# Patient Record
Sex: FEMALE | Race: WHITE | NOT HISPANIC OR LATINO | Employment: OTHER | URBAN - METROPOLITAN AREA
[De-identification: names, ages, dates, MRNs, and addresses within clinical notes are randomized per-mention and may not be internally consistent; named-entity substitution may affect disease eponyms.]

---

## 2017-02-10 ENCOUNTER — APPOINTMENT (OUTPATIENT)
Dept: PREADMISSION TESTING | Facility: HOSPITAL | Age: 57
End: 2017-02-10

## 2017-02-10 ENCOUNTER — HOSPITAL ENCOUNTER (OUTPATIENT)
Dept: GENERAL RADIOLOGY | Facility: HOSPITAL | Age: 57
Discharge: HOME | End: 2017-02-10
Admitting: ORTHOPAEDIC SURGERY

## 2017-02-10 VITALS — WEIGHT: 160.94 LBS | HEIGHT: 66 IN | BODY MASS INDEX: 25.86 KG/M2

## 2017-02-10 LAB
ANION GAP SERPL CALCULATED.3IONS-SCNC: 3 MMOL/L (ref 3–11)
BACTERIA UR QL AUTO: ABNORMAL /HPF
BILIRUB UR QL STRIP: NEGATIVE
BUN BLD-MCNC: 16 MG/DL (ref 9–23)
BUN/CREAT SERPL: 22.9 (ref 7–25)
CALCIUM SPEC-SCNC: 9.4 MG/DL (ref 8.7–10.4)
CHLORIDE SERPL-SCNC: 109 MMOL/L (ref 99–109)
CLARITY UR: CLEAR
CO2 SERPL-SCNC: 30 MMOL/L (ref 20–31)
COLOR UR: YELLOW
CREAT BLD-MCNC: 0.7 MG/DL (ref 0.6–1.3)
DEPRECATED RDW RBC AUTO: 51.9 FL (ref 37–54)
ERYTHROCYTE [DISTWIDTH] IN BLOOD BY AUTOMATED COUNT: 15 % (ref 11.3–14.5)
GFR SERPL CREATININE-BSD FRML MDRD: 86 ML/MIN/1.73
GLUCOSE BLD-MCNC: 92 MG/DL (ref 70–100)
GLUCOSE UR STRIP-MCNC: NEGATIVE MG/DL
HBA1C MFR BLD: 5.8 % (ref 4.8–5.6)
HCT VFR BLD AUTO: 34.5 % (ref 34.5–44)
HGB BLD-MCNC: 11.2 G/DL (ref 11.5–15.5)
HGB UR QL STRIP.AUTO: NEGATIVE
HYALINE CASTS UR QL AUTO: ABNORMAL /LPF
KETONES UR QL STRIP: NEGATIVE
LEUKOCYTE ESTERASE UR QL STRIP.AUTO: NEGATIVE
MCH RBC QN AUTO: 32.6 PG (ref 27–31)
MCHC RBC AUTO-ENTMCNC: 32.5 G/DL (ref 32–36)
MCV RBC AUTO: 100.3 FL (ref 80–99)
NITRITE UR QL STRIP: NEGATIVE
PH UR STRIP.AUTO: 6 [PH] (ref 5–8)
PLATELET # BLD AUTO: 355 10*3/MM3 (ref 150–450)
PMV BLD AUTO: 8.4 FL (ref 6–12)
POTASSIUM BLD-SCNC: 4.5 MMOL/L (ref 3.5–5.5)
PROT UR QL STRIP: NEGATIVE
RBC # BLD AUTO: 3.44 10*6/MM3 (ref 3.89–5.14)
RBC # UR: ABNORMAL /HPF
REF LAB TEST METHOD: ABNORMAL
SODIUM BLD-SCNC: 142 MMOL/L (ref 132–146)
SP GR UR STRIP: 1.02 (ref 1–1.03)
SQUAMOUS #/AREA URNS HPF: ABNORMAL /HPF
UROBILINOGEN UR QL STRIP: NORMAL
WBC NRBC COR # BLD: 10.64 10*3/MM3 (ref 3.5–10.8)
WBC UR QL AUTO: ABNORMAL /HPF

## 2017-02-10 PROCEDURE — 83036 HEMOGLOBIN GLYCOSYLATED A1C: CPT | Performed by: ORTHOPAEDIC SURGERY

## 2017-02-10 PROCEDURE — 71020 HC CHEST PA AND LATERAL: CPT

## 2017-02-10 PROCEDURE — 81001 URINALYSIS AUTO W/SCOPE: CPT | Performed by: ORTHOPAEDIC SURGERY

## 2017-02-10 PROCEDURE — 93010 ELECTROCARDIOGRAM REPORT: CPT | Performed by: INTERNAL MEDICINE

## 2017-02-10 PROCEDURE — 36415 COLL VENOUS BLD VENIPUNCTURE: CPT

## 2017-02-10 PROCEDURE — 85027 COMPLETE CBC AUTOMATED: CPT | Performed by: ORTHOPAEDIC SURGERY

## 2017-02-10 PROCEDURE — 93005 ELECTROCARDIOGRAM TRACING: CPT

## 2017-02-10 PROCEDURE — 80048 BASIC METABOLIC PNL TOTAL CA: CPT | Performed by: ORTHOPAEDIC SURGERY

## 2017-02-10 RX ORDER — GABAPENTIN 800 MG/1
800 TABLET ORAL 3 TIMES DAILY
COMMUNITY

## 2017-02-10 RX ORDER — CLONAZEPAM 2 MG/1
2 TABLET ORAL 3 TIMES DAILY PRN
COMMUNITY

## 2017-02-10 RX ORDER — BUPROPION HYDROCHLORIDE 100 MG/1
100 TABLET ORAL 3 TIMES DAILY
COMMUNITY

## 2017-02-10 RX ORDER — OXYCODONE AND ACETAMINOPHEN 10; 325 MG/1; MG/1
1 TABLET ORAL EVERY 6 HOURS PRN
COMMUNITY
End: 2017-02-17 | Stop reason: HOSPADM

## 2017-02-10 RX ORDER — ESCITALOPRAM OXALATE 10 MG/1
10 TABLET ORAL DAILY
COMMUNITY

## 2017-02-10 RX ORDER — ROSUVASTATIN CALCIUM 40 MG/1
40 TABLET, COATED ORAL DAILY
COMMUNITY

## 2017-02-10 RX ORDER — ZIPRASIDONE HYDROCHLORIDE 80 MG/1
80 CAPSULE ORAL 2 TIMES DAILY WITH MEALS
COMMUNITY

## 2017-02-10 NOTE — PAT
PATIENT REPORTS YEAST INFECTION CURRENTLY. WAS TREATED WITH MEDS FROM PCP IN [place], [place]- [facility] YEAST INFECTION HAS NOT RESOLVED, AND HAS MOVED BACK TO [place] IN THE LAST FEW DAYS AND HAS NOT BEEN ABLE TO GET BACK TO PCP. C/O BURNING WITH URINATION, TELEPHONE ORDER FOR [facility]A RECEIVED FROM DR ARANDA'S SURGERY SCHEDULER.    Measured for TEDS/SCDS in PAT-     calf measurement       10  Length measurement   17

## 2017-02-13 ENCOUNTER — ANESTHESIA (OUTPATIENT)
Dept: PERIOP | Facility: HOSPITAL | Age: 57
End: 2017-02-13

## 2017-02-13 ENCOUNTER — ANESTHESIA EVENT (OUTPATIENT)
Dept: PERIOP | Facility: HOSPITAL | Age: 57
End: 2017-02-13

## 2017-02-13 ENCOUNTER — HOSPITAL ENCOUNTER (INPATIENT)
Facility: HOSPITAL | Age: 57
LOS: 4 days | Discharge: HOME HEALTH | End: 2017-02-17
Attending: ORTHOPAEDIC SURGERY | Admitting: ORTHOPAEDIC SURGERY

## 2017-02-13 ENCOUNTER — APPOINTMENT (OUTPATIENT)
Dept: GENERAL RADIOLOGY | Facility: HOSPITAL | Age: 57
End: 2017-02-13

## 2017-02-13 DIAGNOSIS — Z74.09 IMPAIRED MOBILITY AND ADLS: ICD-10-CM

## 2017-02-13 DIAGNOSIS — Z96.612 S/P REVERSE TOTAL SHOULDER ARTHROPLASTY, LEFT: ICD-10-CM

## 2017-02-13 DIAGNOSIS — Z74.09 IMPAIRED FUNCTIONAL MOBILITY, BALANCE, GAIT, AND ENDURANCE: Primary | ICD-10-CM

## 2017-02-13 DIAGNOSIS — Z78.9 IMPAIRED MOBILITY AND ADLS: ICD-10-CM

## 2017-02-13 PROBLEM — M65.9: Status: ACTIVE | Noted: 2017-02-13

## 2017-02-13 PROBLEM — Z72.0 TOBACCO ABUSE: Status: ACTIVE | Noted: 2017-02-13

## 2017-02-13 PROBLEM — M19.90 ARTHRITIS: Status: ACTIVE | Noted: 2017-02-13

## 2017-02-13 PROBLEM — F31.9 BIPOLAR DISORDER (HCC): Status: ACTIVE | Noted: 2017-02-13

## 2017-02-13 PROBLEM — S42.292A CLOSED 4-PART FRACTURE OF PROXIMAL END OF LEFT HUMERUS: Status: ACTIVE | Noted: 2017-02-13

## 2017-02-13 PROBLEM — G47.30 SLEEP APNEA: Status: ACTIVE | Noted: 2017-02-13

## 2017-02-13 PROBLEM — K21.9 GERD (GASTROESOPHAGEAL REFLUX DISEASE): Status: ACTIVE | Noted: 2017-02-13

## 2017-02-13 PROCEDURE — 25010000002 HYDROMORPHONE PER 4 MG: Performed by: ORTHOPAEDIC SURGERY

## 2017-02-13 PROCEDURE — 73030 X-RAY EXAM OF SHOULDER: CPT

## 2017-02-13 PROCEDURE — 0LS40ZZ REPOSITION LEFT UPPER ARM TENDON, OPEN APPROACH: ICD-10-PCS | Performed by: ORTHOPAEDIC SURGERY

## 2017-02-13 PROCEDURE — 25010000002 VANCOMYCIN PER 500 MG: Performed by: ORTHOPAEDIC SURGERY

## 2017-02-13 PROCEDURE — C1776 JOINT DEVICE (IMPLANTABLE): HCPCS | Performed by: ORTHOPAEDIC SURGERY

## 2017-02-13 PROCEDURE — 99253 IP/OBS CNSLTJ NEW/EST LOW 45: CPT | Performed by: FAMILY MEDICINE

## 2017-02-13 PROCEDURE — 25010000002 ONDANSETRON PER 1 MG: Performed by: NURSE ANESTHETIST, CERTIFIED REGISTERED

## 2017-02-13 PROCEDURE — 0RRK00Z REPLACEMENT OF LEFT SHOULDER JOINT WITH REVERSE BALL AND SOCKET SYNTHETIC SUBSTITUTE, OPEN APPROACH: ICD-10-PCS | Performed by: ORTHOPAEDIC SURGERY

## 2017-02-13 PROCEDURE — 25010000002 MIDAZOLAM PER 1 MG: Performed by: ANESTHESIOLOGY

## 2017-02-13 PROCEDURE — 25010000002 NEOSTIGMINE 10 MG/10ML SOLUTION: Performed by: NURSE ANESTHETIST, CERTIFIED REGISTERED

## 2017-02-13 PROCEDURE — 25010000002 DEXAMETHASONE PER 1 MG: Performed by: NURSE ANESTHETIST, CERTIFIED REGISTERED

## 2017-02-13 PROCEDURE — 25010000002 PROMETHAZINE PER 50 MG: Performed by: NURSE ANESTHETIST, CERTIFIED REGISTERED

## 2017-02-13 PROCEDURE — 25010000002 FENTANYL CITRATE (PF) 100 MCG/2ML SOLUTION: Performed by: NURSE ANESTHETIST, CERTIFIED REGISTERED

## 2017-02-13 PROCEDURE — L3670 SO ACRO/CLAV CAN WEB PRE OTS: HCPCS | Performed by: ORTHOPAEDIC SURGERY

## 2017-02-13 PROCEDURE — 25010000002 PHENYLEPHRINE PER 1 ML: Performed by: NURSE ANESTHETIST, CERTIFIED REGISTERED

## 2017-02-13 PROCEDURE — 25010000002 ROPIVACAINE PER 1 MG: Performed by: NURSE ANESTHETIST, CERTIFIED REGISTERED

## 2017-02-13 PROCEDURE — G0378 HOSPITAL OBSERVATION PER HR: HCPCS

## 2017-02-13 PROCEDURE — C1713 ANCHOR/SCREW BN/BN,TIS/BN: HCPCS | Performed by: ORTHOPAEDIC SURGERY

## 2017-02-13 PROCEDURE — 94799 UNLISTED PULMONARY SVC/PX: CPT

## 2017-02-13 PROCEDURE — 25010000002 PROPOFOL 10 MG/ML EMULSION: Performed by: NURSE ANESTHETIST, CERTIFIED REGISTERED

## 2017-02-13 PROCEDURE — 25010000002 FENTANYL CITRATE (PF) 100 MCG/2ML SOLUTION: Performed by: ANESTHESIOLOGY

## 2017-02-13 DEVICE — SCRW COMPR EQUINOXE LK 4.5X26MM: Type: IMPLANTABLE DEVICE | Site: SHOULDER | Status: FUNCTIONAL

## 2017-02-13 DEVICE — IMPLANTABLE DEVICE: Type: IMPLANTABLE DEVICE | Site: SHOULDER | Status: FUNCTIONAL

## 2017-02-13 DEVICE — SCRW EQUINOXE TORQ DEFINE REV SHLDR KT: Type: IMPLANTABLE DEVICE | Site: SHOULDER | Status: FUNCTIONAL

## 2017-02-13 DEVICE — SCRW COMPR EQUINOXE LK 4.5X18MM: Type: IMPLANTABLE DEVICE | Site: SHOULDER | Status: FUNCTIONAL

## 2017-02-13 DEVICE — PLT/JOINT GLEN EQUINOXE STD/POST: Type: IMPLANTABLE DEVICE | Site: SHOULDER | Status: FUNCTIONAL

## 2017-02-13 DEVICE — GLENOSPHERE SHLDR/REV EQUINOXE 42MM: Type: IMPLANTABLE DEVICE | Site: SHOULDER | Status: FUNCTIONAL

## 2017-02-13 DEVICE — SCRW LK EQUINOXE GLENOSPHERE REV/SHLDR: Type: IMPLANTABLE DEVICE | Site: SHOULDER | Status: FUNCTIONAL

## 2017-02-13 DEVICE — SCRW COMPR EQUINOXE LK 4.5X34MM: Type: IMPLANTABLE DEVICE | Site: SHOULDER | Status: FUNCTIONAL

## 2017-02-13 DEVICE — LINER HUM EQUINOXE REV SHLDR 42 PLS0: Type: IMPLANTABLE DEVICE | Site: SHOULDER | Status: FUNCTIONAL

## 2017-02-13 DEVICE — SCRW COMPR EQUINOXE LK 4.5X30MM: Type: IMPLANTABLE DEVICE | Site: SHOULDER | Status: FUNCTIONAL

## 2017-02-13 DEVICE — TRY HUM EQUINOXE ADPT REV SHLDR PLS0: Type: IMPLANTABLE DEVICE | Site: SHOULDER | Status: FUNCTIONAL

## 2017-02-13 DEVICE — SMARTSET HIGH PERFORMANCE MV MEDIUM VISCOSITY BONE CEMENT 40G
Type: IMPLANTABLE DEVICE | Site: SHOULDER | Status: FUNCTIONAL
Brand: SMARTSET

## 2017-02-13 RX ORDER — ZIPRASIDONE HYDROCHLORIDE 20 MG/1
80 CAPSULE ORAL 2 TIMES DAILY WITH MEALS
Status: DISCONTINUED | OUTPATIENT
Start: 2017-02-13 | End: 2017-02-17 | Stop reason: HOSPADM

## 2017-02-13 RX ORDER — CLONAZEPAM 1 MG/1
2 TABLET ORAL 3 TIMES DAILY PRN
Status: DISCONTINUED | OUTPATIENT
Start: 2017-02-13 | End: 2017-02-13 | Stop reason: SDUPTHER

## 2017-02-13 RX ORDER — ACETAMINOPHEN 325 MG/1
650 TABLET ORAL ONCE
Status: COMPLETED | OUTPATIENT
Start: 2017-02-13 | End: 2017-02-13

## 2017-02-13 RX ORDER — ESMOLOL HYDROCHLORIDE 10 MG/ML
INJECTION INTRAVENOUS AS NEEDED
Status: DISCONTINUED | OUTPATIENT
Start: 2017-02-13 | End: 2017-02-13 | Stop reason: SURG

## 2017-02-13 RX ORDER — FAMOTIDINE 10 MG/ML
20 INJECTION, SOLUTION INTRAVENOUS
Status: DISCONTINUED | OUTPATIENT
Start: 2017-02-13 | End: 2017-02-13 | Stop reason: HOSPADM

## 2017-02-13 RX ORDER — PROMETHAZINE HYDROCHLORIDE 25 MG/ML
6.25 INJECTION, SOLUTION INTRAMUSCULAR; INTRAVENOUS ONCE AS NEEDED
Status: COMPLETED | OUTPATIENT
Start: 2017-02-13 | End: 2017-02-13

## 2017-02-13 RX ORDER — BUPIVACAINE HYDROCHLORIDE 2.5 MG/ML
INJECTION, SOLUTION EPIDURAL; INFILTRATION; INTRACAUDAL AS NEEDED
Status: DISCONTINUED | OUTPATIENT
Start: 2017-02-13 | End: 2017-02-13 | Stop reason: SURG

## 2017-02-13 RX ORDER — CLINDAMYCIN PHOSPHATE 900 MG/50ML
900 INJECTION INTRAVENOUS ONCE
Status: COMPLETED | OUTPATIENT
Start: 2017-02-13 | End: 2017-02-13

## 2017-02-13 RX ORDER — BUPROPION HYDROCHLORIDE 100 MG/1
100 TABLET ORAL EVERY 8 HOURS SCHEDULED
Status: DISCONTINUED | OUTPATIENT
Start: 2017-02-13 | End: 2017-02-17 | Stop reason: HOSPADM

## 2017-02-13 RX ORDER — FAMOTIDINE 20 MG/1
20 TABLET, FILM COATED ORAL 2 TIMES DAILY PRN
Status: DISCONTINUED | OUTPATIENT
Start: 2017-02-13 | End: 2017-02-17 | Stop reason: HOSPADM

## 2017-02-13 RX ORDER — ATORVASTATIN CALCIUM 40 MG/1
80 TABLET, FILM COATED ORAL DAILY
Status: DISCONTINUED | OUTPATIENT
Start: 2017-02-14 | End: 2017-02-17 | Stop reason: HOSPADM

## 2017-02-13 RX ORDER — SENNA AND DOCUSATE SODIUM 50; 8.6 MG/1; MG/1
2 TABLET, FILM COATED ORAL 2 TIMES DAILY
Status: DISCONTINUED | OUTPATIENT
Start: 2017-02-13 | End: 2017-02-17 | Stop reason: HOSPADM

## 2017-02-13 RX ORDER — PROMETHAZINE HYDROCHLORIDE 25 MG/1
25 SUPPOSITORY RECTAL ONCE AS NEEDED
Status: COMPLETED | OUTPATIENT
Start: 2017-02-13 | End: 2017-02-13

## 2017-02-13 RX ORDER — NICOTINE 21 MG/24HR
1 PATCH, TRANSDERMAL 24 HOURS TRANSDERMAL EVERY 24 HOURS
Status: DISCONTINUED | OUTPATIENT
Start: 2017-02-13 | End: 2017-02-17 | Stop reason: HOSPADM

## 2017-02-13 RX ORDER — DOCUSATE SODIUM 100 MG/1
100 CAPSULE, LIQUID FILLED ORAL 2 TIMES DAILY PRN
Status: DISCONTINUED | OUTPATIENT
Start: 2017-02-13 | End: 2017-02-17 | Stop reason: HOSPADM

## 2017-02-13 RX ORDER — PROPOFOL 10 MG/ML
VIAL (ML) INTRAVENOUS AS NEEDED
Status: DISCONTINUED | OUTPATIENT
Start: 2017-02-13 | End: 2017-02-13 | Stop reason: SURG

## 2017-02-13 RX ORDER — NALOXONE HCL 0.4 MG/ML
0.1 VIAL (ML) INJECTION
Status: DISCONTINUED | OUTPATIENT
Start: 2017-02-13 | End: 2017-02-15

## 2017-02-13 RX ORDER — ESCITALOPRAM OXALATE 10 MG/1
10 TABLET ORAL DAILY
Status: DISCONTINUED | OUTPATIENT
Start: 2017-02-14 | End: 2017-02-17 | Stop reason: HOSPADM

## 2017-02-13 RX ORDER — AMITRIPTYLINE HYDROCHLORIDE 50 MG/1
200 TABLET, FILM COATED ORAL NIGHTLY
Status: DISCONTINUED | OUTPATIENT
Start: 2017-02-13 | End: 2017-02-17 | Stop reason: HOSPADM

## 2017-02-13 RX ORDER — NEOSTIGMINE METHYLSULFATE 1 MG/ML
INJECTION, SOLUTION INTRAVENOUS AS NEEDED
Status: DISCONTINUED | OUTPATIENT
Start: 2017-02-13 | End: 2017-02-13 | Stop reason: SURG

## 2017-02-13 RX ORDER — ONDANSETRON 2 MG/ML
INJECTION INTRAMUSCULAR; INTRAVENOUS AS NEEDED
Status: DISCONTINUED | OUTPATIENT
Start: 2017-02-13 | End: 2017-02-13 | Stop reason: SURG

## 2017-02-13 RX ORDER — SODIUM CHLORIDE, SODIUM LACTATE, POTASSIUM CHLORIDE, CALCIUM CHLORIDE 600; 310; 30; 20 MG/100ML; MG/100ML; MG/100ML; MG/100ML
9 INJECTION, SOLUTION INTRAVENOUS CONTINUOUS PRN
Status: DISCONTINUED | OUTPATIENT
Start: 2017-02-13 | End: 2017-02-13 | Stop reason: HOSPADM

## 2017-02-13 RX ORDER — GABAPENTIN 400 MG/1
800 CAPSULE ORAL EVERY 8 HOURS SCHEDULED
Status: DISCONTINUED | OUTPATIENT
Start: 2017-02-13 | End: 2017-02-17 | Stop reason: HOSPADM

## 2017-02-13 RX ORDER — CLONAZEPAM 1 MG/1
2 TABLET ORAL 3 TIMES DAILY PRN
Status: DISCONTINUED | OUTPATIENT
Start: 2017-02-13 | End: 2017-02-17 | Stop reason: HOSPADM

## 2017-02-13 RX ORDER — OXYCODONE AND ACETAMINOPHEN 10; 325 MG/1; MG/1
1 TABLET ORAL EVERY 4 HOURS PRN
Status: DISCONTINUED | OUTPATIENT
Start: 2017-02-13 | End: 2017-02-15

## 2017-02-13 RX ORDER — MIDAZOLAM HYDROCHLORIDE 1 MG/ML
INJECTION INTRAMUSCULAR; INTRAVENOUS AS NEEDED
Status: DISCONTINUED | OUTPATIENT
Start: 2017-02-13 | End: 2017-02-13 | Stop reason: SURG

## 2017-02-13 RX ORDER — SODIUM CHLORIDE 0.9 % (FLUSH) 0.9 %
1-10 SYRINGE (ML) INJECTION AS NEEDED
Status: DISCONTINUED | OUTPATIENT
Start: 2017-02-13 | End: 2017-02-13 | Stop reason: HOSPADM

## 2017-02-13 RX ORDER — LIDOCAINE HYDROCHLORIDE 10 MG/ML
0.5 INJECTION, SOLUTION EPIDURAL; INFILTRATION; INTRACAUDAL; PERINEURAL ONCE AS NEEDED
Status: COMPLETED | OUTPATIENT
Start: 2017-02-13 | End: 2017-02-13

## 2017-02-13 RX ORDER — DEXAMETHASONE SODIUM PHOSPHATE 4 MG/ML
INJECTION, SOLUTION INTRA-ARTICULAR; INTRALESIONAL; INTRAMUSCULAR; INTRAVENOUS; SOFT TISSUE AS NEEDED
Status: DISCONTINUED | OUTPATIENT
Start: 2017-02-13 | End: 2017-02-13 | Stop reason: SURG

## 2017-02-13 RX ORDER — GLYCOPYRROLATE 0.2 MG/ML
INJECTION INTRAMUSCULAR; INTRAVENOUS AS NEEDED
Status: DISCONTINUED | OUTPATIENT
Start: 2017-02-13 | End: 2017-02-13 | Stop reason: SURG

## 2017-02-13 RX ORDER — FENTANYL CITRATE 50 UG/ML
INJECTION, SOLUTION INTRAMUSCULAR; INTRAVENOUS AS NEEDED
Status: DISCONTINUED | OUTPATIENT
Start: 2017-02-13 | End: 2017-02-13 | Stop reason: SURG

## 2017-02-13 RX ORDER — VANCOMYCIN HYDROCHLORIDE 1 G/200ML
15 INJECTION, SOLUTION INTRAVENOUS ONCE
Status: COMPLETED | OUTPATIENT
Start: 2017-02-13 | End: 2017-02-13

## 2017-02-13 RX ORDER — OXYCODONE HYDROCHLORIDE AND ACETAMINOPHEN 5; 325 MG/1; MG/1
1 TABLET ORAL ONCE
Status: COMPLETED | OUTPATIENT
Start: 2017-02-13 | End: 2017-02-13

## 2017-02-13 RX ORDER — VANCOMYCIN HYDROCHLORIDE 1 G/200ML
15 INJECTION, SOLUTION INTRAVENOUS ONCE
Status: COMPLETED | OUTPATIENT
Start: 2017-02-14 | End: 2017-02-14

## 2017-02-13 RX ORDER — LIDOCAINE HYDROCHLORIDE 10 MG/ML
INJECTION, SOLUTION INFILTRATION; PERINEURAL AS NEEDED
Status: DISCONTINUED | OUTPATIENT
Start: 2017-02-13 | End: 2017-02-13 | Stop reason: SURG

## 2017-02-13 RX ORDER — PROMETHAZINE HYDROCHLORIDE 25 MG/1
25 TABLET ORAL ONCE AS NEEDED
Status: COMPLETED | OUTPATIENT
Start: 2017-02-13 | End: 2017-02-13

## 2017-02-13 RX ORDER — SODIUM CHLORIDE 450 MG/100ML
50 INJECTION, SOLUTION INTRAVENOUS CONTINUOUS
Status: DISCONTINUED | OUTPATIENT
Start: 2017-02-13 | End: 2017-02-16

## 2017-02-13 RX ORDER — ROPIVACAINE HYDROCHLORIDE 2 MG/ML
6 INJECTION, SOLUTION EPIDURAL; INFILTRATION CONTINUOUS
Status: DISCONTINUED | OUTPATIENT
Start: 2017-02-13 | End: 2017-02-17 | Stop reason: HOSPADM

## 2017-02-13 RX ORDER — FENTANYL CITRATE 50 UG/ML
50 INJECTION, SOLUTION INTRAMUSCULAR; INTRAVENOUS
Status: DISCONTINUED | OUTPATIENT
Start: 2017-02-13 | End: 2017-02-13 | Stop reason: HOSPADM

## 2017-02-13 RX ORDER — ATRACURIUM BESYLATE 10 MG/ML
INJECTION, SOLUTION INTRAVENOUS AS NEEDED
Status: DISCONTINUED | OUTPATIENT
Start: 2017-02-13 | End: 2017-02-13 | Stop reason: SURG

## 2017-02-13 RX ORDER — FAMOTIDINE 20 MG/1
20 TABLET, FILM COATED ORAL
Status: DISCONTINUED | OUTPATIENT
Start: 2017-02-13 | End: 2017-02-13 | Stop reason: HOSPADM

## 2017-02-13 RX ADMIN — FAMOTIDINE 20 MG: 20 TABLET ORAL at 12:53

## 2017-02-13 RX ADMIN — SODIUM CHLORIDE 50 ML/HR: 4.5 INJECTION, SOLUTION INTRAVENOUS at 18:47

## 2017-02-13 RX ADMIN — GABAPENTIN 800 MG: 400 CAPSULE ORAL at 22:14

## 2017-02-13 RX ADMIN — SODIUM CHLORIDE, POTASSIUM CHLORIDE, SODIUM LACTATE AND CALCIUM CHLORIDE: 600; 310; 30; 20 INJECTION, SOLUTION INTRAVENOUS at 15:10

## 2017-02-13 RX ADMIN — DEXAMETHASONE SODIUM PHOSPHATE 8 MG: 4 INJECTION, SOLUTION INTRAMUSCULAR; INTRAVENOUS at 14:47

## 2017-02-13 RX ADMIN — ZIPRASIDONE HYDROCHLORIDE 80 MG: 20 CAPSULE ORAL at 22:14

## 2017-02-13 RX ADMIN — LIDOCAINE HYDROCHLORIDE 0.2 ML: 10 INJECTION, SOLUTION EPIDURAL; INFILTRATION; INTRACAUDAL; PERINEURAL at 12:08

## 2017-02-13 RX ADMIN — OXYCODONE AND ACETAMINOPHEN 1 TABLET: 5; 325 TABLET ORAL at 17:35

## 2017-02-13 RX ADMIN — Medication 6 ML/HR: at 15:55

## 2017-02-13 RX ADMIN — ESMOLOL HYDROCHLORIDE 10 MG: 10 INJECTION, SOLUTION INTRAVENOUS at 14:31

## 2017-02-13 RX ADMIN — NEOSTIGMINE METHYLSULFATE 3 MG: 1 INJECTION, SOLUTION INTRAVENOUS at 16:45

## 2017-02-13 RX ADMIN — ONDANSETRON 4 MG: 2 INJECTION INTRAMUSCULAR; INTRAVENOUS at 16:35

## 2017-02-13 RX ADMIN — SODIUM CHLORIDE, POTASSIUM CHLORIDE, SODIUM LACTATE AND CALCIUM CHLORIDE 9 ML/HR: 600; 310; 30; 20 INJECTION, SOLUTION INTRAVENOUS at 12:08

## 2017-02-13 RX ADMIN — CLONAZEPAM 2 MG: 1 TABLET ORAL at 20:14

## 2017-02-13 RX ADMIN — PHENYLEPHRINE HYDROCHLORIDE 100 MCG: 10 INJECTION INTRAVENOUS at 14:46

## 2017-02-13 RX ADMIN — PROPOFOL 200 MG: 10 INJECTION, EMULSION INTRAVENOUS at 14:31

## 2017-02-13 RX ADMIN — SODIUM CHLORIDE, POTASSIUM CHLORIDE, SODIUM LACTATE AND CALCIUM CHLORIDE: 600; 310; 30; 20 INJECTION, SOLUTION INTRAVENOUS at 14:15

## 2017-02-13 RX ADMIN — PHENYLEPHRINE HYDROCHLORIDE 100 MCG: 10 INJECTION INTRAVENOUS at 14:51

## 2017-02-13 RX ADMIN — ATRACURIUM BESYLATE 40 MG: 10 INJECTION, SOLUTION INTRAVENOUS at 14:32

## 2017-02-13 RX ADMIN — FENTANYL CITRATE 50 MCG: 50 INJECTION, SOLUTION INTRAMUSCULAR; INTRAVENOUS at 17:10

## 2017-02-13 RX ADMIN — VANCOMYCIN HYDROCHLORIDE 1 G: 1 INJECTION, SOLUTION INTRAVENOUS at 14:45

## 2017-02-13 RX ADMIN — CLINDAMYCIN PHOSPHATE 900 MG: 18 INJECTION, SOLUTION INTRAVENOUS at 14:22

## 2017-02-13 RX ADMIN — PHENYLEPHRINE HYDROCHLORIDE 100 MCG: 10 INJECTION INTRAVENOUS at 14:42

## 2017-02-13 RX ADMIN — PHENYLEPHRINE HYDROCHLORIDE 100 MCG: 10 INJECTION INTRAVENOUS at 14:38

## 2017-02-13 RX ADMIN — ATRACURIUM BESYLATE 10 MG: 10 INJECTION, SOLUTION INTRAVENOUS at 14:43

## 2017-02-13 RX ADMIN — LIDOCAINE HYDROCHLORIDE 50 MG: 10 INJECTION, SOLUTION INFILTRATION; PERINEURAL at 14:31

## 2017-02-13 RX ADMIN — HYDROMORPHONE HYDROCHLORIDE 0.5 MG: 1 INJECTION, SOLUTION INTRAMUSCULAR; INTRAVENOUS; SUBCUTANEOUS at 22:21

## 2017-02-13 RX ADMIN — FENTANYL CITRATE 50 MCG: 50 INJECTION, SOLUTION INTRAMUSCULAR; INTRAVENOUS at 17:20

## 2017-02-13 RX ADMIN — FENTANYL CITRATE 100 MCG: 50 INJECTION, SOLUTION INTRAMUSCULAR; INTRAVENOUS at 13:15

## 2017-02-13 RX ADMIN — MIDAZOLAM HYDROCHLORIDE 2 MG: 1 INJECTION, SOLUTION INTRAMUSCULAR; INTRAVENOUS at 13:15

## 2017-02-13 RX ADMIN — GLYCOPYRROLATE 0.4 MG: 0.2 INJECTION, SOLUTION INTRAMUSCULAR; INTRAVENOUS at 16:45

## 2017-02-13 RX ADMIN — PROMETHAZINE HYDROCHLORIDE 6.25 MG: 25 INJECTION INTRAMUSCULAR; INTRAVENOUS at 17:27

## 2017-02-13 RX ADMIN — DOCUSATE SODIUM AND SENNOSIDES 2 TABLET: 8.6; 5 TABLET, FILM COATED ORAL at 18:56

## 2017-02-13 RX ADMIN — AMITRIPTYLINE HYDROCHLORIDE 200 MG: 50 TABLET, FILM COATED ORAL at 22:14

## 2017-02-13 RX ADMIN — ACETAMINOPHEN 650 MG: 325 TABLET, FILM COATED ORAL at 17:36

## 2017-02-13 RX ADMIN — PROPOFOL 40 MG: 10 INJECTION, EMULSION INTRAVENOUS at 16:00

## 2017-02-13 RX ADMIN — HYDROMORPHONE HYDROCHLORIDE 0.5 MG: 1 INJECTION, SOLUTION INTRAMUSCULAR; INTRAVENOUS; SUBCUTANEOUS at 18:55

## 2017-02-13 RX ADMIN — BUPROPION HYDROCHLORIDE 100 MG: 100 TABLET, FILM COATED ORAL at 22:14

## 2017-02-13 RX ADMIN — BUPIVACAINE HYDROCHLORIDE 20 ML: 2.5 INJECTION, SOLUTION EPIDURAL; INFILTRATION; INTRACAUDAL; PERINEURAL at 13:20

## 2017-02-13 NOTE — BRIEF OP NOTE
TOTAL SHOULDER REVERSE ARTHROPLASTY  Procedure Note    Kayleigh A [place]  2/13/2017    Pre-op Diagnosis:   * No pre-op diagnosis entered *    Post-op Diagnosis:     Post-Op Diagnosis Codes:     * Closed 4-part fracture of proximal end of left humerus [S42.202A]     * Left bicipital tenosynovitis [M75.22]    Procedure/CPT® Codes:  NV RECONSTR TOTAL SHOULDER IMPLANT [21481]  NV REPAIR BICEPS LONG TENDON [17208]    Procedure(s):  LEFT REVERSE TOTAL SHOULDER ARTHROPLASTY FOR PROXIMAL HUMERUS FRACTURE    Surgeon(s):  MD Marcial Sanchez MD, Sports Fellow  Shankar Carr MD, PGY-5    Anesthesia: General with Block    Staff:   Circulator: Mike Colbert RN  Scrub Person: Aurora Jara  Vendor Representative: Jac Jackson  Nursing Assistant: Polina Moore    Estimated Blood Loss: 200 mL    Specimens:                * No specimens in log *      Drains:           Findings: per dictation    Complications: none      Gerson Carcamo MD     Date: 2/13/2017  Time: 4:44 PM

## 2017-02-13 NOTE — H&P
Pre-Op H&P    Chief complaint: Left humerus fracture    HPI:    Patient is a 56 y.o.female presents with left humerus fracture and here today for left reverse total shoulder arthroplasty for proximal humerus fracture.  Fall approximately 2 weeks ago.    Review of Systems:  General ROS: negative for chills, fever or skin lesions;  No changes since last office visit  Cardiovascular ROS: no chest pain or dyspnea on exertion  Respiratory ROS: no cough, shortness of breath, or wheezing    Allergies:   Allergies   Allergen Reactions   • Ceclor [Cefaclor] Rash       Home Meds    Prescriptions Prior to Admission   Medication Sig Dispense Refill Last Dose   • AMITRIPTYLINE HCL PO Take 200 mg by mouth Every Night.   2/12/2017 at 2200   • buPROPion (WELLBUTRIN) 100 MG tablet Take 100 mg by mouth 3 (Three) Times a Day.   2/12/2017 at 1000   • clonazePAM (KlonoPIN) 2 MG tablet Take 2 mg by mouth 3 (Three) Times a Day As Needed for anxiety.   2/11/2017 at Unknown time   • Cyanocobalamin (VITAMIN B 12 PO) Take 1 tablet by mouth Daily.   Past Week at Unknown time   • escitalopram (LEXAPRO) 10 MG tablet Take 10 mg by mouth Daily.   2/12/2017 at 1000   • gabapentin (NEURONTIN) 800 MG tablet Take 800 mg by mouth 3 (Three) Times a Day.   2/12/2017 at 2200   • oxyCODONE-acetaminophen (PERCOCET)  MG per tablet Take 1 tablet by mouth Every 6 (Six) Hours As Needed for moderate pain (4-6).   2/12/2017 at 2200   • rosuvastatin (CRESTOR) 40 MG tablet Take 40 mg by mouth Daily.   2/12/2017 at 2200   • triazolam (HALCION) 0.25 MG tablet Take 0.5 mg by mouth At Night As Needed for sleep.   2/11/2017 at Unknown time   • ziprasidone (GEODON) 80 MG capsule Take 80 mg by mouth 2 (Two) Times a Day With Meals.   2/12/2017 at 1000       PMH:   Past Medical History   Diagnosis Date   • Anxiety    • Arm pain, left    • Arthritis    • Balance problem    • Bipolar disorder    • Depression    • GERD (gastroesophageal reflux disease)    • Migraine    •  "Productive cough      CLEAR TO YELLOW OCCASSIONALLY--\"DUE TO ALLERGIES PER PCP\"   • Sleep apnea      DOES NOT USE CPAP    • Wears glasses      PSH:    Past Surgical History   Procedure Laterality Date   • Total knee arthroplasty Right      X2   • Laparoscopic tubal ligation     • Endoscopy         Immunization History:  Influenza: yes 2016  Pneumococcal: no  Tetanus: unknown    Social History:   Tobacco:   History   Smoking Status   • Current Every Day Smoker   • Packs/day: 1.00   • Years: 40.00   • Types: Cigarettes   Smokeless Tobacco   • Never Used      Alcohol:   History   Alcohol Use No       Physical Exam:  Visit Vitals   • /93 (BP Location: Right arm, Patient Position: Lying)   • Pulse 105   • Temp 97.9 °F (36.6 °C) (Temporal Artery )   • Resp 20   • Ht 66\" (167.6 cm)   • Wt 160 lb (72.6 kg)   • SpO2 95%   • BMI 25.82 kg/m2       General Appearance:    Alert, cooperative, no distress, appears stated age   Head:    Normocephalic, without obvious abnormality, atraumatic   Lungs:     Clear to auscultation bilaterally, respirations unlabored    Heart:   Regular rate and rhythm, S1 and S2 normal, no murmur, rub    or gallop    Abdomen:    Soft, non-tender.  +bowel sounds   Breast Exam:    deferred   Genitalia:    deferred   Extremities:   Extremities normal,no cyanosis or edema.  Left Upper arm with edema/ecchymosis.     Skin:   Skin color, texture, turgor normal, no rashes or lesions   Neurologic:   Grossly intact   Results Review  I reviewed the patient's new clinical results.    Cancer Staging (if applicable)  Cancer Patient: __ yes _X_no __unknown; If yes, clinical stage T:__ N:__M:__, stage group    Impression: Left humerus fracture    Plan: Left reverse total shoulder arthroplasty for proximal humerus fracture    Janeen Lee, APRN 2/13/2017 12:39 PM  "

## 2017-02-13 NOTE — ANESTHESIA PROCEDURE NOTES
Peripheral Block    Patient location during procedure: pre-op  Start time: 2/13/2017 1:15 PM  Stop time: 2/13/2017 1:30 PM  Reason for block: at surgeon's request and post-op pain management  Performed by  Anesthesiologist: GENE BRAY  Preanesthetic Checklist  Completed: patient identified, site marked, surgical consent, pre-op evaluation, timeout performed, IV checked, risks and benefits discussed and monitors and equipment checked  Peripheral Block Prep:  Sterile barriers:cap, gloves, mask and sterile barriers  Prep: ChloraPrep  Patient monitoring: blood pressure monitoring, continuous pulse oximetry and EKG  Peripheral Procedure  Sedation:yes  Guidance:ultrasound guided  Images:still images obtained  Laterality:leftBlock Type:interscalene  Injection Technique:catheterNeedle Type:Tuohy  Needle Gauge:18 G  Catheter Size:20 G (20g)  Medications  Local Injected:bupivacaine 0.25% without epinephrine Local Amount Injected:20mL  Post Assessment  Patient Tolerance:comfortable throughout block  Complications:no  Additional Notes  Procedure:                Catheter at skin-6cm                                      Anesthesia was provide by fentanyl and midazolam      The pt was placed in semifowlers position with a slight tilt of the thorax contralateral to the insertion site.  The Insertion Site was prepped and draped in sterile fashion.  The skin was anesthetized with Lidocaine 1% 1ml injection utilizing a 25g needle.  Utilizing ultrasound guidance, a BBraun 2 inch 18 g Contiplex echogenic touhy needle was advanced in-plane.  Hydro dissection of tissue was achieved with Normal saline. Major vessels(carotid and Internal Jugular) where visualized as the brachial plexus was approached at the approximate level of C-7/ T-1.  Cervical 5 and Branches of Cervical 6 nerve roots where visualized and the needle tip was placed posterior at the level of C-6 roots.  LA spread was visualized and injection was made  incrementally every 5 mls with aspiration. Injection pressure was normal or little, there was no intraneural injection, no vascular injection.      The BBraun 20 g wire stylet  catheter was then placed under US guidance on the posterior aspect of the Brachial Plexus.  Location of catheter was confirmed with NS injection visualized with US . The tuohy was then removed and the skin was sealed with Skin AFix at catheter insertion site.  Skin was prepped with mastisol and the labeled catheter  was secured with steristrips and a CHG tegaderm. Thank You.

## 2017-02-13 NOTE — ANESTHESIA PROCEDURE NOTES
Airway  Urgency: elective    Airway not difficult    General Information and Staff    Patient location during procedure: OR  Anesthesiologist: GENE BRAY  CRNA: ANGELA PEREZ    Indications and Patient Condition  Indications for airway management: airway protection    Preoxygenated: yes  MILS not maintained throughout  Mask difficulty assessment: 1 - vent by mask    Final Airway Details  Final airway type: endotracheal airway      Successful airway: ETT  Cuffed: yes   Successful intubation technique: direct laryngoscopy  Endotracheal tube insertion site: oral  Blade: Marcus  Blade size: #3  ETT size: 7.0 mm  Cormack-Lehane Classification: grade IIb - view of arytenoids or posterior of glottis only  Placement verified by: chest auscultation and capnometry   Measured from: lips  ETT to lips (cm): 20  Number of attempts at approach: 1    Additional Comments  Negative epigastric sounds, Breath sound equal bilaterally with symmetric chest rise and fall, poor dentition, lips and teeth as pre op

## 2017-02-13 NOTE — NURSING NOTE
Acute Pain Service:  On-Q teaching completed with patient and Sister in law.  Video demonstration, handout and bracelet provided with [facility] on call central phone number.  Instructed to call with any questions or concerns.  Patient verbalized understanding.  Service will continue to follow until catheter DC'd.  Please contact patient at [phone] if needed.

## 2017-02-13 NOTE — ANESTHESIA POSTPROCEDURE EVALUATION
Patient: Kayleigh PH[place]EN[place] Jay    Procedure Summary     Date Anesthesia Start Anesthesia Stop Room / Location    02/13/17 1424 1706 [facility] OR 14 / [facility]       Procedure Diagnosis Surgeon Provider    LEFT REVERSE TOTAL SHOULDER ARTHROPLASTY FOR PROXIMAL HUMERUS FRACTURE (Left Shoulder) Closed 4-part fracture of proximal end of left humerus; Left bicipital tenosynovitis MD Viet Sanchez MD          Anesthesia Type: general, regional  Last vitals  BP (!) 136/29 (02/13/17 1704)    Temp 98 °F (36.7 °C) (02/13/17 1704)    Pulse 105 (02/13/17 1704)   Resp 16 (02/13/17 1704)    SpO2 99 % (02/13/17 1704)      Post Anesthesia Care and Evaluation    Patient location during evaluation: PACU  Patient participation: complete - patient participated  Level of consciousness: awake and alert  Pain score: 0  Pain management: adequate  Airway patency: patent  Anesthetic complications: No anesthetic complications  PONV Status: none  Cardiovascular status: hemodynamically stable and acceptable  Respiratory status: nonlabored ventilation, acceptable and nasal cannula  Hydration status: acceptable

## 2017-02-13 NOTE — ANESTHESIA PREPROCEDURE EVALUATION
Anesthesia Evaluation     Patient summary reviewed and Nursing notes reviewed   no history of anesthetic complications:  NPO Status: > 8 hours   Airway   Mallampati: III  TM distance: >3 FB  Neck ROM: full  possible difficult intubation  Dental - normal exam     Pulmonary    (+) a smoker Current, COPD mild, sleep apnea on CPAP, decreased breath sounds,   Cardiovascular - normal exam  Exercise tolerance: good (4-7 METS)    ECG reviewed  Rhythm: regular  Rate: normal        Neuro/Psych  (+) headaches, psychiatric history Depression,    GI/Hepatic/Renal/Endo    (+)  GERD well controlled,     Musculoskeletal     Abdominal   (+) obese,     Abdomen: soft.   Substance History      OB/GYN          Other   (+) arthritis                                 Anesthesia Plan    ASA 3     general and regional     intravenous induction   Anesthetic plan and risks discussed with patient.    Plan discussed with CRNA.

## 2017-02-13 NOTE — OP NOTE
DATE OF OPERATION: 02/13/17  PREOPERATIVE DIAGNOSIS: Left shoulder four-part proximal humerus fracture  with head split/comminution  POSTOPERATIVE DIAGNOSES:  1. Left shoulder proximal humerus fracture  2. Biceps tenosynovitis.    3. Left shoulder glenohumeral arthritis  PROCEDURES PERFORMED:  1. Left reverse total shoulder arthroplasty.    2. Left biceps tenodesis.    SURGEON: Gerson Carcamo MD  ASSISTANTS:  1. Marcial Augustin MD, Sports Fellow  2. Shankar Carr MD, PGY-5.    ANESTHESIA: General plus block.    ESTIMATED BLOOD LOSS:200mL.    COMPLICATIONS: None.    DISPOSITION: Recovery room in stable condition.    IMPLANTS: Ex[employer] Equinoxe reverse total shoulder system, 8.5 mm fracture stem cemented, 0 metal liner tray, 42-0 polyethylene tray, standard baseplate with 4 screws with locking caps, and a 42mm glenosphere.    INDICATIONS: This is a 56-year-old female who sustained a displaced proximal humerus fracture.  After a discussion of risks, benefits, and alternatives, the patient wished to proceed with reverse shoulder arthroplasty.  DESCRIPTION OF PROCEDURE: On the day of surgery, the patient identified the left shoulder as the correct operative extremity. This was initialed by the surgeon with the patient's acknowledgment. The patient underwent placement of an interscalene block and was then taken to the operating room and placed in the supine position. Upon induction of adequate anesthesia, the patient was brought up to the beach chair position and the left shoulder and upper extremity were prepped and draped in the usual sterile fashion. A timeout confirmed the correct patient and operative extremity, and that antibiotics were on board. A standard deltopectoral approach to the shoulder was carried out and was carried sharply through the skin and subcutaneous tissue. Medial and lateral flaps were developed over the deltopectoral fascia. The cephalic vein was identified and mobilized laterally with the deltoid.  The subdeltoid and subpectoral spaces were mobilized and a blunt retractor placed deep to this. The clavipectoral fascia was opened on the lateral edge of the conjoined tendon. The retractors were moved deep to this. The fracture was readily identified. The leading edge of the pec was released and the long head of the biceps was exposed and was tenosynovitic, and was therefore tenodesed to the pec and released proximal to this. The tuberosities were then identified. A tagging stitch was placed at the bone-tendon junction of the lesser tuberosity and subscapularis. Same thing was performed at the supraspinatus greater tuberosity junction and infraspinatus greater tuberosity junction. The tuberosities were  from the head segment and the head was then removed. The tuberosities were then  and the shaft was retracted posteriorly exposing the glenoid. Circumferential labral excision and capsular release was carried out. A centering hole was drilled. The glenoid was then reamed gently. A large centering hole was then drilled in the center of the glenoid and the baseplate was impacted in. One inferior screw, 1 anteroinferior screw, 1 posteroinferior, screw, and 1 anterosuperior screw were placed with excellent purchase in all 4 screws. Locking caps were placed. The glenosphere was then inserted and locked in uneventfully. The humerus was subluxed back anteriorly. The canal was entered, cleaned. Trialing was carried out after reaming and the appropriate size was chosen. The stem was then trialed and the final components chosen. A cement restrictor was inserted. Tampon was then used to obtain hemostasis while the cement was prepared. The cement was inserted into the canal and the stem was then inserted in 20° of retroversion. Excess cement was removed and the stem was reduced and pressurized. Again, excess cement was removed and the arm was held still until the cement was cured. Trialing was then carried out  and the liner tray was chosen and implanted.The tuberosities were carefully repaired back to the stem with bone graft from the head packed under the tuberosities using two #2 FiberWire sutures for each tuberosity in a horizontal fashion as well as one #5 FiberWire in a cerclage fashion around the stem and both tuberosities. The resulting construct was stable to near full elevation, external rotation to approximately 30°, and internal rotation to the chest. The patient will be limited to 30° external rotation in the postoperative period. The wound was then copiously irrigated with orthopedic irrigation mixed with Betadine. The deltopectoral interval was approximated with 0 Vicryl, the subcutaneous tissue with 2-0 Vicryl, and the skin with Monocryl and Dermabond. Sterile dressing was placed. Anesthesia was reversed and the patient was taken to the recovery room in stable condition. All instrument, needle, and sponge counts were correct.       Gerson Carcamo MD*

## 2017-02-14 ENCOUNTER — APPOINTMENT (OUTPATIENT)
Dept: GENERAL RADIOLOGY | Facility: HOSPITAL | Age: 57
End: 2017-02-14
Attending: ORTHOPAEDIC SURGERY

## 2017-02-14 PROBLEM — Z96.619 S/P REVERSE TOTAL SHOULDER ARTHROPLASTY: Status: ACTIVE | Noted: 2017-02-14

## 2017-02-14 PROBLEM — R50.9 FEVER: Status: ACTIVE | Noted: 2017-02-14

## 2017-02-14 LAB
ANION GAP SERPL CALCULATED.3IONS-SCNC: 6 MMOL/L (ref 3–11)
BASOPHILS # BLD AUTO: 0.01 10*3/MM3 (ref 0–0.2)
BASOPHILS NFR BLD AUTO: 0.1 % (ref 0–1)
BILIRUB UR QL STRIP: NEGATIVE
BUN BLD-MCNC: 16 MG/DL (ref 9–23)
BUN/CREAT SERPL: 32 (ref 7–25)
CALCIUM SPEC-SCNC: 9.6 MG/DL (ref 8.7–10.4)
CHLORIDE SERPL-SCNC: 103 MMOL/L (ref 99–109)
CLARITY UR: CLEAR
CO2 SERPL-SCNC: 28 MMOL/L (ref 20–31)
COLOR UR: YELLOW
CREAT BLD-MCNC: 0.5 MG/DL (ref 0.6–1.3)
DEPRECATED RDW RBC AUTO: 52.9 FL (ref 37–54)
EOSINOPHIL # BLD AUTO: 0.01 10*3/MM3 (ref 0.1–0.3)
EOSINOPHIL NFR BLD AUTO: 0.1 % (ref 0–3)
ERYTHROCYTE [DISTWIDTH] IN BLOOD BY AUTOMATED COUNT: 14.9 % (ref 11.3–14.5)
GFR SERPL CREATININE-BSD FRML MDRD: 127 ML/MIN/1.73
GLUCOSE BLD-MCNC: 117 MG/DL (ref 70–100)
GLUCOSE UR STRIP-MCNC: NEGATIVE MG/DL
HCT VFR BLD AUTO: 31 % (ref 34.5–44)
HGB BLD-MCNC: 10 G/DL (ref 11.5–15.5)
HGB UR QL STRIP.AUTO: NEGATIVE
IMM GRANULOCYTES # BLD: 0.03 10*3/MM3 (ref 0–0.03)
IMM GRANULOCYTES NFR BLD: 0.2 % (ref 0–0.6)
KETONES UR QL STRIP: NEGATIVE
LEUKOCYTE ESTERASE UR QL STRIP.AUTO: NEGATIVE
LYMPHOCYTES # BLD AUTO: 1.84 10*3/MM3 (ref 0.6–4.8)
LYMPHOCYTES NFR BLD AUTO: 14.4 % (ref 24–44)
MCH RBC QN AUTO: 31.3 PG (ref 27–31)
MCHC RBC AUTO-ENTMCNC: 32.3 G/DL (ref 32–36)
MCV RBC AUTO: 96.9 FL (ref 80–99)
MONOCYTES # BLD AUTO: 1.09 10*3/MM3 (ref 0–1)
MONOCYTES NFR BLD AUTO: 8.5 % (ref 0–12)
NEUTROPHILS # BLD AUTO: 9.82 10*3/MM3 (ref 1.5–8.3)
NEUTROPHILS NFR BLD AUTO: 76.7 % (ref 41–71)
NITRITE UR QL STRIP: NEGATIVE
PH UR STRIP.AUTO: 5.5 [PH] (ref 5–8)
PLATELET # BLD AUTO: 397 10*3/MM3 (ref 150–450)
PMV BLD AUTO: 8.4 FL (ref 6–12)
POTASSIUM BLD-SCNC: 3.5 MMOL/L (ref 3.5–5.5)
PROT UR QL STRIP: NEGATIVE
RBC # BLD AUTO: 3.2 10*6/MM3 (ref 3.89–5.14)
SODIUM BLD-SCNC: 137 MMOL/L (ref 132–146)
SP GR UR STRIP: <=1.005 (ref 1–1.03)
UROBILINOGEN UR QL STRIP: NORMAL
WBC NRBC COR # BLD: 12.8 10*3/MM3 (ref 3.5–10.8)

## 2017-02-14 PROCEDURE — 97110 THERAPEUTIC EXERCISES: CPT

## 2017-02-14 PROCEDURE — 25010000002 HYDROMORPHONE PER 4 MG: Performed by: ORTHOPAEDIC SURGERY

## 2017-02-14 PROCEDURE — 97166 OT EVAL MOD COMPLEX 45 MIN: CPT | Performed by: OCCUPATIONAL THERAPIST

## 2017-02-14 PROCEDURE — 25010000002 VANCOMYCIN PER 500 MG: Performed by: ORTHOPAEDIC SURGERY

## 2017-02-14 PROCEDURE — 71020 HC CHEST PA AND LATERAL: CPT

## 2017-02-14 PROCEDURE — 99233 SBSQ HOSP IP/OBS HIGH 50: CPT | Performed by: HOSPITALIST

## 2017-02-14 PROCEDURE — 81003 URINALYSIS AUTO W/O SCOPE: CPT | Performed by: HOSPITALIST

## 2017-02-14 PROCEDURE — 85025 COMPLETE CBC W/AUTO DIFF WBC: CPT | Performed by: ORTHOPAEDIC SURGERY

## 2017-02-14 PROCEDURE — 84132 ASSAY OF SERUM POTASSIUM: CPT | Performed by: NURSE PRACTITIONER

## 2017-02-14 PROCEDURE — 97161 PT EVAL LOW COMPLEX 20 MIN: CPT

## 2017-02-14 PROCEDURE — 94799 UNLISTED PULMONARY SVC/PX: CPT

## 2017-02-14 PROCEDURE — 80048 BASIC METABOLIC PNL TOTAL CA: CPT | Performed by: ORTHOPAEDIC SURGERY

## 2017-02-14 PROCEDURE — 97530 THERAPEUTIC ACTIVITIES: CPT | Performed by: OCCUPATIONAL THERAPIST

## 2017-02-14 PROCEDURE — 97162 PT EVAL MOD COMPLEX 30 MIN: CPT

## 2017-02-14 RX ORDER — POTASSIUM CHLORIDE 7.45 MG/ML
10 INJECTION INTRAVENOUS
Status: DISCONTINUED | OUTPATIENT
Start: 2017-02-14 | End: 2017-02-17 | Stop reason: HOSPADM

## 2017-02-14 RX ORDER — POTASSIUM CHLORIDE 750 MG/1
40 CAPSULE, EXTENDED RELEASE ORAL AS NEEDED
Status: DISCONTINUED | OUTPATIENT
Start: 2017-02-14 | End: 2017-02-17 | Stop reason: HOSPADM

## 2017-02-14 RX ORDER — ACETAMINOPHEN 325 MG/1
650 TABLET ORAL EVERY 6 HOURS SCHEDULED
Status: DISCONTINUED | OUTPATIENT
Start: 2017-02-14 | End: 2017-02-15

## 2017-02-14 RX ORDER — POTASSIUM CHLORIDE 1.5 G/1.77G
40 POWDER, FOR SOLUTION ORAL AS NEEDED
Status: DISCONTINUED | OUTPATIENT
Start: 2017-02-14 | End: 2017-02-17 | Stop reason: HOSPADM

## 2017-02-14 RX ADMIN — POTASSIUM CHLORIDE 40 MEQ: 750 CAPSULE, EXTENDED RELEASE ORAL at 10:49

## 2017-02-14 RX ADMIN — BUPROPION HYDROCHLORIDE 100 MG: 100 TABLET, FILM COATED ORAL at 13:46

## 2017-02-14 RX ADMIN — OXYCODONE HYDROCHLORIDE AND ACETAMINOPHEN 1 TABLET: 10; 325 TABLET ORAL at 14:23

## 2017-02-14 RX ADMIN — OXYCODONE HYDROCHLORIDE AND ACETAMINOPHEN 1 TABLET: 10; 325 TABLET ORAL at 23:05

## 2017-02-14 RX ADMIN — NICOTINE 1 PATCH: 14 PATCH TRANSDERMAL at 23:06

## 2017-02-14 RX ADMIN — DOCUSATE SODIUM AND SENNOSIDES 2 TABLET: 8.6; 5 TABLET, FILM COATED ORAL at 18:52

## 2017-02-14 RX ADMIN — ACETAMINOPHEN 650 MG: 325 TABLET, FILM COATED ORAL at 23:06

## 2017-02-14 RX ADMIN — GABAPENTIN 800 MG: 400 CAPSULE ORAL at 13:46

## 2017-02-14 RX ADMIN — GABAPENTIN 800 MG: 400 CAPSULE ORAL at 23:05

## 2017-02-14 RX ADMIN — CLONAZEPAM 2 MG: 1 TABLET ORAL at 02:11

## 2017-02-14 RX ADMIN — ATORVASTATIN CALCIUM 80 MG: 40 TABLET, FILM COATED ORAL at 08:16

## 2017-02-14 RX ADMIN — OXYCODONE HYDROCHLORIDE AND ACETAMINOPHEN 1 TABLET: 10; 325 TABLET ORAL at 06:08

## 2017-02-14 RX ADMIN — CLONAZEPAM 2 MG: 1 TABLET ORAL at 21:22

## 2017-02-14 RX ADMIN — GABAPENTIN 800 MG: 400 CAPSULE ORAL at 06:08

## 2017-02-14 RX ADMIN — DOCUSATE SODIUM AND SENNOSIDES 2 TABLET: 8.6; 5 TABLET, FILM COATED ORAL at 08:16

## 2017-02-14 RX ADMIN — HYDROMORPHONE HYDROCHLORIDE 0.5 MG: 1 INJECTION, SOLUTION INTRAMUSCULAR; INTRAVENOUS; SUBCUTANEOUS at 20:47

## 2017-02-14 RX ADMIN — HYDROMORPHONE HYDROCHLORIDE 0.5 MG: 1 INJECTION, SOLUTION INTRAMUSCULAR; INTRAVENOUS; SUBCUTANEOUS at 15:39

## 2017-02-14 RX ADMIN — BUPROPION HYDROCHLORIDE 100 MG: 100 TABLET, FILM COATED ORAL at 06:07

## 2017-02-14 RX ADMIN — ESCITALOPRAM OXALATE 10 MG: 10 TABLET ORAL at 08:16

## 2017-02-14 RX ADMIN — OXYCODONE HYDROCHLORIDE AND ACETAMINOPHEN 1 TABLET: 10; 325 TABLET ORAL at 02:11

## 2017-02-14 RX ADMIN — BUPROPION HYDROCHLORIDE 100 MG: 100 TABLET, FILM COATED ORAL at 21:22

## 2017-02-14 RX ADMIN — VANCOMYCIN HYDROCHLORIDE 1000 MG: 1 INJECTION, SOLUTION INTRAVENOUS at 04:39

## 2017-02-14 RX ADMIN — ACETAMINOPHEN 650 MG: 325 TABLET, FILM COATED ORAL at 13:46

## 2017-02-14 RX ADMIN — ZIPRASIDONE HYDROCHLORIDE 80 MG: 20 CAPSULE ORAL at 08:15

## 2017-02-14 RX ADMIN — OXYCODONE HYDROCHLORIDE AND ACETAMINOPHEN 1 TABLET: 10; 325 TABLET ORAL at 18:44

## 2017-02-14 RX ADMIN — HYDROMORPHONE HYDROCHLORIDE 0.5 MG: 1 INJECTION, SOLUTION INTRAMUSCULAR; INTRAVENOUS; SUBCUTANEOUS at 04:38

## 2017-02-14 RX ADMIN — AMITRIPTYLINE HYDROCHLORIDE 200 MG: 50 TABLET, FILM COATED ORAL at 20:47

## 2017-02-14 RX ADMIN — ZIPRASIDONE HYDROCHLORIDE 80 MG: 20 CAPSULE ORAL at 18:52

## 2017-02-14 RX ADMIN — POTASSIUM CHLORIDE 40 MEQ: 750 CAPSULE, EXTENDED RELEASE ORAL at 18:52

## 2017-02-14 NOTE — PLAN OF CARE
Problem: Patient Care Overview (Adult)  Goal: Plan of Care Review  Outcome: Ongoing (interventions implemented as appropriate)    02/14/17 1504   Coping/Psychosocial Response Interventions   Plan Of Care Reviewed With patient   Outcome Evaluation   Outcome Summary/Follow up Plan PT initial eval complete. Pt. demonstrates good LE strength but impaired balance and high fall risk due to decreased awareness of surroundings. Pt. ambulates with an ataxic gait, wide MORE, and severe ER of RLE. Recommend pt. be d/c to inpatient rehab facility.           Problem: Inpatient Physical Therapy  Goal: Bed Mobility Goal LTG- PT  Outcome: Ongoing (interventions implemented as appropriate)    02/14/17 1504   Bed Mobility PT LTG   Bed Mobility PT LTG, Date Established 02/14/17   Bed Mobility PT LTG, Time to Achieve 5 days   Bed Mobility PT LTG, Activity Type all bed mobility   Bed Mobility PT LTG, Era Level conditional independence       Goal: Transfer Training Goal 1 LTG- PT  Outcome: Ongoing (interventions implemented as appropriate)    02/14/17 1504   Transfer Training PT LTG   Transfer Training PT LTG, Date Established 02/14/17   Transfer Training PT LTG, Time to Achieve 5 days   Transfer Training PT LTG, Activity Type all transfers   Transfer Training PT LTG, Era Level conditional independence       Goal: Gait Training Goal LTG- PT  Outcome: Ongoing (interventions implemented as appropriate)    02/14/17 1504   Gait Training PT LTG   Gait Training Goal PT LTG, Date Established 02/14/17   Gait Training Goal PT LTG, Time to Achieve 5 days   Gait Training Goal PT LTG, Era Level supervision required   Gait Training Goal PT LTG, Assist Device cane, straight   Gait Training Goal PT LTG, Distance to Achieve 500 ft.       Goal: Stair Training Goal LTG- PT  Outcome: Ongoing (interventions implemented as appropriate)    02/14/17 1504   Stair Training PT LTG   Stair Training Goal PT LTG, Date Established 02/14/17    Stair Training Goal PT LTG, Time to Achieve 5 days   Stair Training Goal PT LTG, Number of Steps 10   Stair Training Goal PT LTG, [place] [employer] Level conditional independence   Stair Training Goal PT LTG, Assist Device 1 handrail

## 2017-02-14 NOTE — PROGRESS NOTES
"Acute Care - Occupational Therapy Initial Evaluation  [facility]     Patient Name: Kayleigh Thompson  : 1960  MRN: [id number]  Today's Date: 2017  Onset of Illness/Injury or Date of Surgery Date: 17  Date of Referral to OT: 17  Referring Physician: Dr. Carcamo    Admit Date: 2017       ICD-10-CM ICD-9-CM   1. Impaired functional mobility, balance, gait, and endurance Z74.09 V49.89   2. Impaired mobility and ADLs Z74.09 799.89     Patient Active Problem List   Diagnosis   • Closed 4-part fracture of proximal end of left humerus   • Tenosynovitis of left upper arm   • Sleep apnea   • GERD (gastroesophageal reflux disease)   • Bipolar disorder   • Arthritis   • Tobacco abuse   • Fever   • S/p left reverse total shoulder arthroplasty     Past Medical History   Diagnosis Date   • Anxiety    • Arm pain, left    • Arthritis    • Balance problem    • Bipolar disorder    • Depression    • GERD (gastroesophageal reflux disease)    • Migraine    • Productive cough      CLEAR TO YELLOW OCCASSIONALLY--\"DUE TO ALLERGIES PER PCP\"   • Sleep apnea      DOES NOT USE CPAP    • Wears glasses      Past Surgical History   Procedure Laterality Date   • Total knee arthroplasty Right      X2   • Laparoscopic tubal ligation     • Endoscopy     • Reverse total shoulder arthroplasty Left      17 by Dr. Carcamo   • Total shoulder arthroplasty w/ distal clavicle excision Left 2017     Procedure: LEFT REVERSE TOTAL SHOULDER ARTHROPLASTY FOR PROXIMAL HUMERUS FRACTURE;  Surgeon: Gerson Carcamo MD;  Location: [facility];  Service:           OT ASSESSMENT FLOWSHEET (last 72 hours)      OT Evaluation       17 1600 17 1537 17 1532 17 1528 17 1330    Rehab Evaluation    Document Type    evaluation;therapy note (daily note)  -AR (P)  evaluation  -LB    Subjective Information    agree to therapy;complains of;pain  -AR (P)  agree to therapy;no complaints  -LB    Patient Effort, " Rehab Treatment    good  -AR     Symptoms Noted During/After Treatment    none  -AR (P)  none  -LB    Symptoms Noted Comment    Nurse reports pt febrile earlier in day, no fever when RN took pt's temperature during evaluation  -AR (P)  --   pt had a fever of 101.3 before beginning PT; nsg notified  -LB    General Information    Patient Profile Review    yes  -AR (P)  yes  -LB    Onset of Illness/Injury or Date of Surgery Date    02/13/17  -AR (P)  02/13/17  -LB    Referring Physician    Dr. Carcamo  -AR (P)  MD Carlos Alberto  -LB    General Observations    pt supine, just returned from CXR  -AR (P)  Interscalene catheter present. Pt. had small scabs on both knees from previous falls.   -LB    Pertinent History Of Current Problem    Pt is a 56 yof s/p fall x 2 weeks ago with resultant left 4-part proximal humerus FX and presents for surgical repair of fracture. Pt is POD#1 left RTSA and OT orders indicate PROM FE no limit, IR chest and ER 30; AROM elbow/wrist/hand and scapular retractions.   -AR (P)  Pt. fell 2 wks ago and sustained a closed 4-part fx of the proximal head of her L humerus and L bicipital tenosynovitis. Underwent L reverse TSA on 2/13/17.  -LB    Precautions/Limitations    non-weight bearing status;insensate limb;other (see comments)   interscalene nerve catheter  -AR (P)  fall precautions   pt. reports she is clumsy and has had multiple falls this yr  -LB    Prior Level of Function    independent:;all household mobility;community mobility;transfer;gait;ADL's;driving  -AR (P)  independent:;all household mobility;community mobility;ADL's  -LB    Equipment Currently Used at Home  cane, straight  -RS  none  -AR (P)  none   pt. reports she has a shower chair but doesn't use it  -LB    Plans/Goals Discussed With    patient;agreed upon  -AR (P)  patient;agreed upon  -LB    Risks Reviewed    patient:;LOB;nausea/vomiting;dizziness;increased discomfort;change in vital signs;increased drainage;lines disloged  -AR (P)   patient:;LOB   potential fall risk due to lack of awareness of surroundings  -LB    Benefits Reviewed    patient:;improve function;increase independence;increase strength;increase balance;decrease pain;decrease risk of DVT;increase knowledge  -AR (P)  patient:;improve function;increase independence;increase balance  -LB    Barriers to Rehab    none identified  -AR (P)  environmental barriers   pt. just moved and has a lot of clutter in her home  -LB    Living Environment    Lives With  parent(s)  -RS  parent(s)   mother  -AR (P)  parent(s)   81 yo mother; if d/c to home, may benefit from [employer]  -LB    Living Arrangements  condominium  -RS  condominium  -AR (P)  house  -LB    Home Accessibility    stairs to enter home;bed and bath on same level;tub/shower is not walk in   also has walk-in shower  -AR (P)  stairs to enter home;stairs (1 railing present)  -LB    Number of Stairs to Enter Home    10  -AR (P)  10  -LB    Stair Railings at Home    outside, present on right side  -AR     Type of Financial/Environmental Concern    none  -AR     Transportation Available  car;family or friend will provide  -RS  car;family or friend will provide  -AR (P)  none   pt. reports she has no car or form of transportation  -LB    Living Environment Comment    Pt reports that her and her mother just moved back to [place], [place] from [place], [place] 2 days ago.  Pt will be living with her mother and desires DC to acute rehab  -AR (P)  Potential fall risk due to lots of clutter around the house from moving (per pt.)  -LB    Clinical Impression    Date of Referral to OT    02/13/17  -AR     OT Diagnosis    decreased independence with ADLs  -AR     Patient/Family Goals Statement    go to acute rehab  -AR     Criteria for Skilled Therapeutic Interventions Met    yes;treatment indicated  -AR     Rehab Potential    good, to achieve stated therapy goals  -AR     Therapy Frequency    daily   per priority policy  -AR     Anticipated  Discharge Disposition    inpatient rehabilitation facility  -AR     Functional Level Prior    Ambulation   0-->independent  -RS      Transferring   0-->independent  -RS      Toileting   0-->independent  -RS      Bathing   0-->independent  -RS      Dressing   0-->independent  -RS      Eating   0-->independent  -RS      Communication   0-->understands/communicates without difficulty  -RS      Swallowing   0-->swallows foods/liquids without difficulty  -RS      Vital Signs    Pre Systolic BP Rehab     (P)  130  -LB    Pre Treatment Diastolic BP     (P)  79  -LB    Pretreatment Heart Rate (beats/min)    104  -AR     Intratreatment Heart Rate (beats/min)    114  -AR     Posttreatment Heart Rate (beats/min)    109  -AR     Pre SpO2 (%)    92  -AR (P)  91  -LB    O2 Delivery Pre Treatment    supplemental O2   2L NC  -AR (P)  supplemental O2   2 Liters  -LB    Intra SpO2 (%)     (P)  94  -LB    O2 Delivery Intra Treatment     (P)  room air  -LB    Post SpO2 (%)    93  -AR (P)  95  -LB    O2 Delivery Post Treatment    supplemental O2  -AR (P)  supplemental O2   2 Liters  -LB    Pre Patient Position     (P)  Sitting  -LB    Intra Patient Position     (P)  Standing  -LB    Post Patient Position     (P)  Sitting  -LB    Pain Assessment    Pain Assessment    0-10  -AR (P)  0-10  -LB    Pain Score    4  -AR (P)  4  -LB    Post Pain Score    6  -AR (P)  4  -LB    Pain Type    Acute pain  -AR Acute pain  -SC (r) LB (t) SC (c)    Pain Location    Shoulder  -AR Shoulder  -SC (r) LB (t) SC (c)    Pain Orientation    Left   axilla  -AR Left  -SC (r) LB (t) SC (c)    Pain Intervention(s)    Medication (See MAR);Repositioned;Ambulation/increased activity  -AR Cold applied;Repositioned;Ambulation/increased activity  -SC (r) LB (t) SC (c)    Response to Interventions    tolerated  -AR pain decreased slightly after interventions  -SC (r) LB (t) SC (c)    Multiple Pain Sites    Yes  -AR     Pain 2    Pain Score 2    4  -AR     Pre Tx Pain  Score 2    4  -AR     Post Tx Pain Score 2    0  -AR     Pain Type 2    Acute pain  -AR     Pain Location 2    Arm  -AR     Pain Orientation 2    --   forearm  -AR     Pain Intervention(s) 2    Medication (See MAR);Repositioned;Ambulation/increased activity  -AR     Response to Interventions 2    no c/o pain, improved at end  -AR     Vision Assessment/Intervention    Visual Impairment    WNL  -AR     Cognitive Assessment/Intervention    Current Cognitive/Communication Assessment    functional  -AR (P)  functional  -LB    Orientation Status    oriented x 4  -AR (P)  oriented x 4;oriented to;person;place;time;situation  -LB    Follows Commands/Answers Questions    100% of the time;able to follow single-step instructions  -AR (P)  100% of the time  -LB    Personal Safety    impulsive;moderate impairment   pt standing prior to gait belt application despite cues  -AR (P)  decreased awareness, need for safety;decreased awareness, need for assist   high fall risk (pt has fallen multiple times this yr alone)  -LB    Personal Safety Interventions    fall prevention program maintained  -AR (P)  gait belt;fall prevention program maintained;nonskid shoes/slippers when out of bed  -LB    ROM (Range of Motion)    General ROM    upper extremity range of motion deficits identified  -AR upper extremity range of motion deficits identified  -SC (r) LB (t) SC (c)    General ROM Detail    --   RUE WNL, LUE deferred  -AR LUE ROM limited by sling  -SC (r) LB (t) SC (c)    MMT (Manual Muscle Testing)    General MMT Assessment    upper extremity strength deficits identified  -AR upper extremity strength deficits identified  -SC (r) LB (t) SC (c)    General MMT Assessment Detail    --   RUE WNL, LUE deferred  -AR /hand strength neuro intact, arm strength limited by sling  -SC (r) LB (t) SC (c)    Muscle Tone Assessment    Muscle Tone Assessment LUE  -SF        LUE Muscle Tone Assessment moderately decreased tone  -SF        Mobility  Assessment/Training    Extremity Weight-Bearing Status    left upper extremity  -AR (P)  left upper extremity  -LB    Left Upper Extremity Weight-Bearing    non weight-bearing  -AR (P)  non weight-bearing   arm immobilized in sling  -LB    Bed Mobility, Assessment/Treatment    Bed Mobility, Assistive Device    bed rails;head of bed elevated;draw sheet  -AR     Bed Mobility, Scoot/Bridge, [place]    maximum assist (25% patient effort);2 person assist required  -AR     Bed Mob, Supine to Sit, [place]    minimum assist (75% patient effort);verbal cues required  -AR     Bed Mob, Sit to Supine, [place]    minimum assist (75% patient effort);2 person assist required;verbal cues required  -AR     Bed Mobility, Impairments    decreased flexibility;pain   shoulder precautions  -AR     Bed Mobility, Comment    educated pt on maintaining NWB LUE during bed mobility  -AR defered   pt on commode upon PT arrival  -SC (r) LB (t) SC (c)    Transfer Assessment/Treatment    Transfers, Sit-Stand [place]    contact guard assist  -AR (P)  contact guard assist  -LB    Transfers, Stand-Sit [place]    contact guard assist  -AR (P)  contact guard assist  -LB    Transfers, Sit-Stand-Sit, Assist Device    straight cane  -AR     Toilet Transfer, [place]    contact guard assist  -AR     Toilet Transfer, Assistive Device    straight cane  -AR     Transfer, Safety Issues     balance decreased during turns   wide MORE  -SC (r) LB (t) SC (c)    Transfer, Impairments    ROM decreased;strength decreased;impaired balance  -AR     Transfer, Comment    Pt impulsive and attempting to stand without assist despite cues to   -AR     Functional Mobility    Functional Mobility- Ind. Level    contact guard assist;verbal cues required  -AR     Functional Mobility- Device    straight cane  -AR     Stairs Assessment/Treatment    Number of Stairs     3  -SC (r) LB (t) SC (c)    Stairs, Handrail Location     both sides  -SC (r) LB (t)  SC (c)    Stairs, [place] Level     supervision required;contact guard assist  -SC (r) LB (t) SC (c)    Stairs, Safety Issues     balance decreased during turns   verbal cues provided to use handrail, move slowly  -SC (r) LB (t) SC (c)    Stairs, Impairments     impaired balance  -SC (r) LB (t) SC (c)    Stairs, Comment     pt can currently only use her right arm to grab the handrail  -SC (r) LB (t) SC (c)    Upper Body Bathing Assessment/Training    UB Bathing Assess/Train, Comment    educted pt on left shoudler precautions and left axilla care  -AR     Upper Body Dressing Assessment/Training    UB Dressing Assess/Train, Clothing Type    doffing:;donning:;hospital gown   sling  -AR     UB Dressing Assess/Train, Assist Device    alley technique  -AR     UB Dressing Assess/Train, Position    edge of bed  -AR     UB Dressing Assess/Train, [place]    maximum assist (25% patient effort);verbal cues required  -AR     UB Dressing Assess/Train, Impairments    ROM decreased;strength decreased;sensation decreased;pain   shoulder precautions'  -AR     UB Dressing Assess/Train, Comment    Pt educated on left shoulder precautions and ADL retraining to maintain, sling management including wear schedule, application, proper fit, care of ON_Q ball during ADL retraining  -AR     Lower Body Dressing Assessment/Training    LB Dressing Assess/Train, Clothing Type    doffing:;slipper socks  -AR     LB Dressing Assess/Train, Position    supine  -AR     LB Dressing Assess/Train, [place]    dependent (less than 25% patient effort)  -AR     Toileting Assessment/Training    Toileting Assess/Train, Assistive Device    grab bars  -AR     Toileting Assess/Train, Position    standing  -AR     Toileting Assess/Train, Indepen Level    minimum assist (75% patient effort)  -AR     Motor Skills/Interventions    Additional Documentation    Balance Skills Training (Group)  -AR Balance Skills Training (Group)  -SC (r) LB (t) SC (c)     Balance Skills Training    Sitting-Level of Assistance    Distant supervision  -AR     Sitting-Balance Support    Feet supported  -AR     Standing-Level of Assistance    Contact guard  -AR     Static Standing Balance Support    assistive device  -AR     Gait Balance-Level of Assistance     Contact guard  -SC (r) LB (t) SC (c)    Gait Balance Support     assistive device   straight cane  -SC (r) LB (t) SC (c)    Therapy Exercises    Left Upper Extremity    AROM:;10 reps;supine;elbow flexion/extension;hand pumps;pronation/supination;shoulder protraction/retraction;PROM:;shoulder ER/IR;shoulder extension/flexion   issued and reviewed E HEP  -AR     Exercise Protocols    --   tolerated PROM , IR chest and ER 15  -AR     Sensory Assessment/Intervention    Sensory Impairment     --   pt. states no numbess in affected arm  -SC (r) LB (t) SC (c)    General Therapy Interventions    ADL Retraining    educated pt on left shoudler precautions, ADL retraining to maintain, care fo ON-Q ball during UB ADLS  -AR     Bed Mobility Training    educated pt on bed mobility technqiue to maintain NWB LUE  -AR     Home Exercise Program    issued and reviewe E HEP  -AR     Transfer Training    educated pt on safe transfer training  -AR     Positioning and Restraints    Pre-Treatment Position    in bed  -AR bathroom  -SC (r) LB (t) SC (c)    Post Treatment Position    bed  -AR chair  -SC (r) LB (t) SC (c)    In Bed    supine;call light within reach;encouraged to call for assist;exit alarm on;with brace  -AR     In Chair     sitting;call light within reach;encouraged to call for assist;notified nsg  -SC (r) LB (t) SC (c)      02/14/17 1200 02/14/17 0800 02/13/17 2015 02/13/17 1836       Muscle Tone Assessment    Muscle Tone Assessment LUE  -SF        LUE Muscle Tone Assessment moderately decreased tone  -SF moderately decreased tone  -SF  moderately decreased tone  -TB     Sensory Assessment/Intervention    LUE Light Touch    moderate impairment   left shoulder  -AM        User Key  (r) = Recorded By, (t) = Taken By, (c) = Cosigned By    Initials Name Effective Dates    SC Susan Cotsa, PT 06/19/15 -     AR Marlena Agrawal, OT 06/22/15 -     TB Arden Silvestre, ANGIE 06/16/16 -     RS Daysi Rawls V 05/02/16 -     AM Natalia Puentes, ANGIE 06/16/16 -     SF Genevieve Mustafa, RNA 01/09/17 -     LB Berkley Barbosa, PT Student 03/23/16 -            Occupational Therapy Education     Title: PT OT SLP Therapies (Active)     Topic: Occupational Therapy (Done)     Point: ADL training (Done)    Description: Instruct learner(s) on proper safety adaptation and remediation techniques during self care or transfers.   Instruct in proper use of assistive devices.    Learning Progress Summary    Learner Readiness Method Response Comment Documented by Status   Patient CYRUS Moseley,AMIE,GONZALES DAY Reviewed left shoulder precautions, ADL retraining to maintain, care of ON-Q ball during ADL activities, LUE HEP, NWB LUE, bed mobility, transfer training, DC recommendation of rehab AR 02/14/17 1710 Done               Point: Home exercise program (Done)    Description: Instruct learner(s) on appropriate technique for monitoring, assisting and/or progressing therapeutic exercises/activities.    Learning Progress Summary    Learner Readiness Method Response Comment Documented by Status   Patient CYRUS Moseley,AMIE,GONZALES DAY Reviewed left shoulder precautions, ADL retraining to maintain, care of ON-Q ball during ADL activities, LUE HEP, NWB LUE, bed mobility, transfer training, DC recommendation of rehab AR 02/14/17 1710 Done               Point: Precautions (Done)    Description: Instruct learner(s) on prescribed precautions during self-care and functional transfers.    Learning Progress Summary    Learner Readiness Method Response Comment Documented by Status   Patient Segun HANEY,CYRUS,AMIE,GONZALES DAY Reviewed left shoulder precautions, ADL retraining to maintain, care of ON-Q ball during  ADL activities, LUE HEP, NWB LUE, bed mobility, transfer training, DC recommendation of rehab AR 02/14/17 1710 Done               Point: Body mechanics (Done)    Description: Instruct learner(s) on proper positioning and spine alignment during self-care, functional mobility activities and/or exercises.    Learning Progress Summary    Learner Readiness Method Response Comment Documented by Status   Patient Eager E,TB,D,H ZULEIKA,GONZALES Reviewed left shoulder precautions, ADL retraining to maintain, care of ON-Q ball during ADL activities, LUE HEP, NWB LUE, bed mobility, transfer training, DC recommendation of rehab AR 02/14/17 1710 Done                      User Key     Initials Effective Dates Name Provider Type Discipline    AR 06/22/15 -  Marlena Agrawal OT Occupational Therapist OT                  OT Recommendation and Plan  Anticipated Discharge Disposition: inpatient rehabilitation facility  Therapy Frequency: daily (per priority policy)  Plan of Care Review  Plan Of Care Reviewed With: patient  Outcome Summary/Follow up Plan: OT evaluation complete. Pt seen after rescue block and was premedicated with IV pain medication per her request. Pt rated pain 4 at start and 7/10 at end of session in left axilla area. She tolerated PROM , IR chest and ER 15. Recommend IP rehab in preparation for safe DC home.            OT Goals       02/14/17 1712          Transfer Training OT LTG    Transfer Training OT LTG, Date Established 02/14/17  -AR      Transfer Training OT LTG, Time to Achieve 1 wk  -AR      Transfer Training OT LTG, Activity Type sit to stand/stand to sit;toilet  -AR      Transfer Training OT LTG, [place] Level supervision required;verbal cues required  -AR      Transfer Training OT LTG, Assist Device cane, straight  -AR      Range of Motion OT LTG    Range of Motion Goal OT LTG, Date Established 02/14/17  -AR      Range of Motion Goal OT LTG, Time to Achieve 1 wk  -AR      Range of Motion Goal OT LTG,  AROM Measure Pt will particiapte in LUE HEP within physician parameters daily to support ADL function   -AR      Patient Education OT LTG    Patient Education OT LTG, Date Established 02/14/17  -AR      Patient Education OT LTG, Time to Achieve 1 wk  -AR      Patient Education OT LTG, Education Type written program;HEP;precautions per surgeon;1 hand/alley technique  -AR      Patient Education OT LTG, Education Understanding demonstrates adequately;verbalizes understanding  -AR      UB Dressing OT LTG    UB Dressing Goal OT LTG, Date Established 02/14/17  -AR      UB Dressing Goal OT LTG, Time to Achieve 1 wk  -AR      UB Dressing Goal OT LTG, Activity Type Pt will don/doff LUE sling  -AR      UB Dressing Goal OT LTG, [place] Level verbal cues required;moderate assist (50% patient effort)  -AR        User Key  (r) = Recorded By, (t) = Taken By, (c) = Cosigned By    Initials Name Provider Type    BEBETO Agrawal, OT Occupational Therapist                Outcome Measures       02/14/17 1528 02/14/17 1330       How much help from another person do you currently need...    Turning from your back to your side while in flat bed without using bedrails?  3  -SC (r) LB (t) SC (c)     Moving from lying on back to sitting on the side of a flat bed without bedrails?  3  -SC (r) LB (t) SC (c)     Moving to and from a bed to a chair (including a wheelchair)?  4  -SC (r) LB (t) SC (c)     Standing up from a chair using your arms (e.g., wheelchair, bedside chair)?  4  -SC (r) LB (t) SC (c)     Climbing 3-5 steps with a railing?  3  -SC (r) LB (t) SC (c)     To walk in hospital room?  3  -SC (r) LB (t) SC (c)     AM-PAC 6 Clicks Score  20  -SC (r) LB (t)     How much help from another is currently needed...    Putting on and taking off regular lower body clothing? 1  -AR      Bathing (including washing, rinsing, and drying) 2  -AR      Toileting (which includes using toilet bed pan or urinal) 3  -AR      Putting on and  taking off regular upper body clothing 2  -AR      Taking care of personal grooming (such as brushing teeth) 3  -AR      Eating meals 3  -AR      Score 14  -AR      Functional Assessment    Outcome Measure Options AM-PAC 6 Clicks Daily Activity (OT)  -AR AM-PAC 6 Clicks Basic Mobility (PT)  -SC (r) LB (t) SC (c)       User Key  (r) = Recorded By, (t) = Taken By, (c) = Cosigned By    Initials Name Provider Type    ASHLI Costa, PT Physical Therapist    AR Marlena Agrawal, OT Occupational Therapist    KAYLAH Barbosa, PT Student PT Student          Time Calculation:   OT Start Time: 1528    Therapy Charges for Today     Code Description Service Date Service Provider Modifiers Qty    [id number]  OT EVAL MOD COMPLEXITY 3 2/14/2017 Marlena Agrawal OT GO 1    [id number]  OT THER SUPP EA 15 MIN 2/14/2017 Marlena Agrawal OT GO 2    [id number]  OT THERAPEUTIC ACT EA 15 MIN 2/14/2017 Marlena Agrawal OT GO 2               Marlena Agrawal OT  2/14/2017

## 2017-02-14 NOTE — PLAN OF CARE
Problem: Patient Care Overview (Adult)  Goal: Plan of Care Review  Outcome: Ongoing (interventions implemented as appropriate)    02/14/17 1712   Coping/Psychosocial Response Interventions   Plan Of Care Reviewed With patient   Outcome Evaluation   Outcome Summary/Follow up Plan OT evaluation complete. Pt seen after rescue block and was premedicated with IV pain medication per her request. Pt rated pain 4 at start and 7/10 at end of session in left axilla area. She tolerated PROM , IR chest and ER 15. Recommend IP rehab in preparation for safe DC home.          Problem: Inpatient Occupational Therapy  Goal: Transfer Training Goal 1 LTG- OT  Outcome: Ongoing (interventions implemented as appropriate)    02/14/17 1712   Transfer Training OT LTG   Transfer Training OT LTG, Date Established 02/14/17   Transfer Training OT LTG, Time to Achieve 1 wk   Transfer Training OT LTG, Activity Type sit to stand/stand to sit;toilet   Transfer Training OT LTG, [place] Level supervision required;verbal cues required   Transfer Training OT LTG, Assist Device cane, straight       Goal: Range of Motion Goal LTG- OT  Outcome: Ongoing (interventions implemented as appropriate)    02/14/17 1712   Range of Motion OT LTG   Range of Motion Goal OT LTG, Date Established 02/14/17   Range of Motion Goal OT LTG, Time to Achieve 1 wk   Range of Motion Goal OT LTG, AROM Measure Pt will particiapte in LUE HEP within physician parameters daily to support ADL function        Goal: Patient Education Goal LTG- OT  Outcome: Ongoing (interventions implemented as appropriate)    02/14/17 1712   Patient Education OT LTG   Patient Education OT LTG, Date Established 02/14/17   Patient Education OT LTG, Time to Achieve 1 wk   Patient Education OT LTG, Education Type written program;HEP;precautions per surgeon;1 hand/alley technique   Patient Education OT LTG, Education Understanding demonstrates adequately;verbalizes understanding       Goal: UB  Dressing Goal LTG- OT  Outcome: Ongoing (interventions implemented as appropriate)    02/14/17 1712   UB Dressing OT LTG   UB Dressing Goal OT LTG, Date Established 02/14/17   UB Dressing Goal OT LTG, Time to Achieve 1 wk   UB Dressing Goal OT LTG, Activity Type Pt will don/doff LUE sling   UB Dressing Goal OT LTG, [place] Level verbal cues required;moderate assist (50% patient effort)

## 2017-02-14 NOTE — PROGRESS NOTES
"Acute Care - Physical Therapy Initial Evaluation  [facility]     Patient Name: Kayleigh Thompson  : 1960  MRN: [id number]  Today's Date: 2017   Onset of Illness/Injury or Date of Surgery Date: 17  Date of Referral to PT: (P) 17  Referring Physician: Dr. Carcamo      Admit Date: 2017     Visit Dx:    ICD-10-CM ICD-9-CM   1. Impaired functional mobility, balance, gait, and endurance Z74.09 V49.89   2. Impaired mobility and ADLs Z74.09 799.89     Patient Active Problem List   Diagnosis   • Closed 4-part fracture of proximal end of left humerus   • Tenosynovitis of left upper arm   • Sleep apnea   • GERD (gastroesophageal reflux disease)   • Bipolar disorder   • Arthritis   • Tobacco abuse     Past Medical History   Diagnosis Date   • Anxiety    • Arm pain, left    • Arthritis    • Balance problem    • Bipolar disorder    • Depression    • GERD (gastroesophageal reflux disease)    • Migraine    • Productive cough      CLEAR TO YELLOW OCCASSIONALLY--\"DUE TO ALLERGIES PER PCP\"   • Sleep apnea      DOES NOT USE CPAP    • Wears glasses      Past Surgical History   Procedure Laterality Date   • Total knee arthroplasty Right      X2   • Laparoscopic tubal ligation     • Endoscopy     • Reverse total shoulder arthroplasty Left      17 by Dr. Carcamo   • Total shoulder arthroplasty w/ distal clavicle excision Left 2017     Procedure: LEFT REVERSE TOTAL SHOULDER ARTHROPLASTY FOR PROXIMAL HUMERUS FRACTURE;  Surgeon: Gerson Carcamo MD;  Location: [facility];  Service:           PT ASSESSMENT (last 72 hours)      PT Evaluation       17 1537 17 1528    Rehab Evaluation    Document Type  evaluation  -AR    Subjective Information  agree to therapy;complains of;pain  -AR    Patient Effort, Rehab Treatment  good  -AR    General Information    Patient Profile Review  yes  -AR    Onset of Illness/Injury or Date of Surgery Date  17  -AR    Referring Physician  Dr. Carcamo  -AR "    General Observations  pt supine, just returned from CXR  -AR    Pertinent History Of Current Problem  Pt is a 56 yof   -AR    Equipment Currently Used at Home cane, straight  -RS     Living Environment    Lives With parent(s)  -RS parent(s)   mother  -AR    Living Arrangements condominium  -RS condominium  -AR    Home Accessibility  stairs to enter home;bed and bath on same level;tub/shower is not walk in   also has walk-in shower  -AR    Number of Stairs to Enter Home  10  -AR    Stair Railings at Home  outside, present on right side  -AR    Type of Financial/Environmental Concern  none  -AR    Transportation Available car;family or friend will provide  -RS car;family or friend will provide  -AR    Living Environment Comment  Pt careas foer her eldeerly mother and desires DC to acute rehab  -AR      02/14/17 1330 02/14/17 1200    Rehab Evaluation    Document Type (P)  evaluation  -LB     Subjective Information (P)  agree to therapy;no complaints  -LB     Symptoms Noted During/After Treatment (P)  none  -LB     Symptoms Noted Comment (P)  --   pt had a fever of 101.3 before beginning PT; nsg notified  -LB     General Information    Patient Profile Review (P)  yes  -LB     Onset of Illness/Injury or Date of Surgery Date (P)  02/13/17  -LB     Referring Physician (P)  MD Carlos Alberto  -LB     General Observations (P)  Interscalene catheter present. Pt. had small scabs on both knees from previous falls.   -LB     Pertinent History Of Current Problem (P)  Pt. fell 2 wks ago and sustained a closed 4-part fx of the proximal head of her L humerus and L bicipital tenosynovitis. Underwent L reverse TSA on 2/13/17.  -LB     Precautions/Limitations (P)  fall precautions   pt. reports she is clumsy and has had multiple falls this yr  -LB     Prior Level of Function (P)  independent:;all household mobility;community mobility;ADL's  -LB     Equipment Currently Used at Home (P)  none   pt. reports she has a shower chair but doesn't  use it  -LB     Plans/Goals Discussed With (P)  patient;agreed upon  -LB     Risks Reviewed (P)  patient:;LOB   potential fall risk due to lack of awareness of surroundings  -LB     Benefits Reviewed (P)  patient:;improve function;increase independence;increase balance  -LB     Barriers to Rehab (P)  environmental barriers   pt. just moved and has a lot of clutter in her home  -LB     Living Environment    Lives With (P)  parent(s)   79 yo mother; if d/c to home, may benefit from [employer]  -LB     Living Arrangements (P)  house  -LB     Home Accessibility (P)  stairs to enter home;stairs (1 railing present)  -LB     Number of Stairs to Enter Home (P)  10  -LB     Transportation Available (P)  none   pt. reports she has no car or form of transportation  -LB     Living Environment Comment (P)  Potential fall risk due to lots of clutter around the house from moving (per pt.)  -LB     Clinical Impression    Date of Referral to PT (P)  02/13/17  -LB     PT Diagnosis (P)  Impaired balance, gait, and functional mobility  -LB     Patient/Family Goals Statement (P)  to be d/c to [facility] and then have  PT  -LB     Criteria for Skilled Therapeutic Interventions Met (P)  yes  -LB     Pathology/Pathophysiology Noted (Describe Specifically for Each System) musculoskeletal  -SC (r) LB (t) SC (c)     Impairments Found (describe specific impairments) gait, locomotion, and balance  -SC (r) LB (t) SC (c)     Rehab Potential (P)  good, to achieve stated therapy goals  -LB     Vital Signs    Pre Systolic BP Rehab (P)  130  -LB     Pre Treatment Diastolic BP (P)  79  -LB     Pre SpO2 (%) (P)  91  -LB     O2 Delivery Pre Treatment (P)  supplemental O2   2 Liters  -LB     Intra SpO2 (%) (P)  94  -LB     O2 Delivery Intra Treatment (P)  room air  -LB     Post SpO2 (%) (P)  95  -LB     O2 Delivery Post Treatment (P)  supplemental O2   2 Liters  -LB     Pre Patient Position (P)  Sitting  -LB     Intra Patient Position (P)  Standing   -LB     Post Patient Position (P)  Sitting  -LB     Pain Assessment    Pain Assessment (P)  0-10  -LB     Pain Score (P)  4  -LB     Post Pain Score (P)  4  -LB     Pain Type Acute pain  -SC (r) LB (t) SC (c)     Pain Location Shoulder  -SC (r) LB (t) SC (c)     Pain Orientation Left  -SC (r) LB (t) SC (c)     Pain Intervention(s) Cold applied;Repositioned;Ambulation/increased activity  -SC (r) LB (t) SC (c)     Response to Interventions pain decreased slightly after interventions  -SC (r) LB (t) SC (c)     Cognitive Assessment/Intervention    Current Cognitive/Communication Assessment (P)  functional  -LB     Orientation Status (P)  oriented x 4;oriented to;person;place;time;situation  -LB     Follows Commands/Answers Questions (P)  100% of the time  -LB     Personal Safety (P)  decreased awareness, need for safety;decreased awareness, need for assist   high fall risk (pt has fallen multiple times this yr alone)  -LB     Personal Safety Interventions (P)  gait belt;fall prevention program maintained;nonskid shoes/slippers when out of bed  -LB     ROM (Range of Motion)    General ROM upper extremity range of motion deficits identified  -SC (r) LB (t) SC (c)     General ROM Detail LUE ROM limited by sling  -SC (r) LB (t) SC (c)     MMT (Manual Muscle Testing)    General MMT Assessment upper extremity strength deficits identified  -SC (r) LB (t) SC (c)     General MMT Assessment Detail /hand strength neuro intact, arm strength limited by sling  -SC (r) LB (t) SC (c)     Muscle Tone Assessment    Muscle Tone Assessment  LUE  -SF    LUE Muscle Tone Assessment  moderately decreased tone  -SF    Mobility Assessment/Training    Extremity Weight-Bearing Status (P)  left upper extremity  -LB     Left Upper Extremity Weight-Bearing (P)  non weight-bearing   arm immobilized in sling  -LB     Bed Mobility, Assessment/Treatment    Bed Mobility, Comment defered   pt on commode upon PT arrival  -SC (r) LB (t) SC (c)      Transfer Assessment/Treatment    Transfers, Sit-Stand [place] (P)  contact guard assist  -LB     Transfers, Stand-Sit [place] (P)  contact guard assist  -LB     Transfer, Safety Issues balance decreased during turns   wide MORE  -SC (r) LB (t) SC (c)     Gait Assessment/Treatment    Gait, [place] Level (P)  contact guard assist  -LB     Gait, Assistive Device (P)  straight cane   provided to pt. on 2/14/17  -LB     Gait, Distance (Feet) (P)  350  -LB     Gait, Gait Pattern Analysis (P)  swing-through gait  -LB     Gait, Gait Deviations ataxia;other (see comments)   wide MORE, R LE ER, gaze focused on floor not surroundings   -SC (r) LB (t) SC (c)     Gait, Safety Issues balance decreased during turns   wide MORE  -SC (r) LB (t) SC (c)     Gait, Impairments impaired balance  -SC (r) LB (t) SC (c)     Gait, Comment pt neglects to observe surroundings when ambulating and focuses only on the floor   verbal cues provided for slow gait speed, eyes/head up  -SC (r) LB (t) SC (c)     Stairs Assessment/Treatment    Number of Stairs 3  -SC (r) LB (t) SC (c)     Stairs, Handrail Location both sides  -SC (r) LB (t) SC (c)     Stairs, [place] Level supervision required;contact guard assist  -SC (r) LB (t) SC (c)     Stairs, Safety Issues balance decreased during turns   verbal cues provided to use handrail, move slowly  -SC (r) LB (t) SC (c)     Stairs, Impairments impaired balance  -SC (r) LB (t) SC (c)     Stairs, Comment pt can currently only use her right arm to grab the handrail  -SC (r) LB (t) SC (c)     Motor Skills/Interventions    Additional Documentation Balance Skills Training (Group)  -SC (r) LB (t) SC (c)     Balance Skills Training    Gait Balance-Level of Assistance Contact guard  -SC (r) LB (t) SC (c)     Gait Balance Support assistive device   straight cane  -SC (r) LB (t) SC (c)     Sensory Assessment/Intervention    Sensory Impairment --   pt. states no numbess in affected arm  -SC (r) LB (t)  SC (c)     Positioning and Restraints    Pre-Treatment Position bathroom  -SC (r) LB (t) SC (c)     Post Treatment Position chair  -SC (r) LB (t) SC (c)     In Chair sitting;call light within reach;encouraged to call for assist;notified nsg  -SC (r) LB (t) SC (c)       02/14/17 0800 02/13/17 2015    Muscle Tone Assessment    LUE Muscle Tone Assessment moderately decreased tone  -SF     Sensory Assessment/Intervention    LUE Light Touch  moderate impairment   left shoulder  -AM      02/13/17 1836       Muscle Tone Assessment    LUE Muscle Tone Assessment moderately decreased tone  -TB       User Key  (r) = Recorded By, (t) = Taken By, (c) = Cosigned By    Initials Name Provider Type    SC Susan Costa, PT Physical Therapist    AR Marlena Agrawal, OT Occupational Therapist    TB Arden Silvestre, ANGIE Registered Nurse    SHANEL Rawls V     AM Natalia Puentes, ANGIE Registered Nurse    SF VERNELL Briscoe Registered Nurse    KAYLAH Barbosa, PT Student PT Student          Physical Therapy Education     Title: PT OT SLP Therapies (Active)     Topic: Physical Therapy (Active)     Point: Mobility training (Active)    Learning Progress Summary    Learner Readiness Method Response Comment Documented by Status   Patient Acceptance E NR   02/14/17 1511 Active               Point: Body mechanics (Active)    Learning Progress Summary    Learner Readiness Method Response Comment Documented by Status   Patient Acceptance E NR   02/14/17 1511 Active               Point: Precautions (Active)    Learning Progress Summary    Learner Readiness Method Response Comment Documented by Status   Patient Acceptance E NR   02/14/17 1511 Active                      User Key     Initials Effective Dates Name Provider Type [facility] 03/23/16 -  Berkley Barbosa, PT Student PT Student PT                PT Recommendation and Plan  Anticipated Equipment Needs At Discharge: (P) standard cane, bathing equipment, reacher  (provided pt. with straight cane on 2/14/17)  Anticipated Discharge Disposition: (P) inpatient rehabilitation facility  Planned Therapy Interventions: bed mobility training, gait training, stair training, transfer training, strengthening, patient/family education  PT Frequency: (P) daily  Plan of Care Review  Plan Of Care Reviewed With: patient  Outcome Summary/Follow up Plan: PT initial eval complete. Pt. demonstrates good LE strength but impaired balance and high fall risk due to decreased awareness of surroundings. Pt. ambulates with an ataxic gait, wide MORE, and severe ER of RLE.           IP PT Goals       02/14/17 1504          Bed Mobility PT LTG    Bed Mobility PT LTG, Date Established 02/14/17  -SC (r) LB (t) SC (c)      Bed Mobility PT LTG, Time to Achieve 5 days  -SC (r) LB (t) SC (c)      Bed Mobility PT LTG, Activity Type all bed mobility  -SC (r) LB (t) SC (c)      Bed Mobility PT LTG, [place] Level conditional independence  -SC (r) LB (t) SC (c)      Transfer Training PT LTG    Transfer Training PT LTG, Date Established 02/14/17  -SC (r) LB (t) SC (c)      Transfer Training PT LTG, Time to Achieve 5 days  -SC (r) LB (t) SC (c)      Transfer Training PT LTG, Activity Type all transfers  -SC (r) LB (t) SC (c)      Transfer Training PT LTG, [place] Level conditional independence  -SC (r) LB (t) SC (c)      Gait Training PT LTG    Gait Training Goal PT LTG, Date Established 02/14/17  -SC (r) LB (t) SC (c)      Gait Training Goal PT LTG, Time to Achieve 5 days  -SC (r) LB (t) SC (c)      Gait Training Goal PT LTG, [place] Level supervision required  -SC (r) LB (t) SC (c)      Gait Training Goal PT LTG, Assist Device cane, straight  -SC (r) LB (t) SC (c)      Gait Training Goal PT LTG, Distance to Achieve 500 ft.  -SC (r) LB (t) SC (c)      Stair Training PT LTG    Stair Training Goal PT LTG, Date Established 02/14/17  -SC (r) LB (t) SC (c)      Stair Training Goal PT LTG, Time to Achieve 5  days  -SC (r) LB (t) SC (c)      Stair Training Goal PT LTG, Number of Steps 10  -SC (r) LB (t) SC (c)      Stair Training Goal PT LTG, [place] Level conditional independence  -SC (r) LB (t) SC (c)      Stair Training Goal PT LTG, Assist Device 1 handrail  -SC (r) LB (t) SC (c)        User Key  (r) = Recorded By, (t) = Taken By, (c) = Cosigned By    Initials Name Provider Type    ASHLI Costa, PT Physical Therapist    KAYLAH Barbosa, PT Student PT Student                Outcome Measures       02/14/17 1330          How much help from another person do you currently need...    Turning from your back to your side while in flat bed without using bedrails? 3  -SC (r) LB (t) SC (c)      Moving from lying on back to sitting on the side of a flat bed without bedrails? 3  -SC (r) LB (t) SC (c)      Moving to and from a bed to a chair (including a wheelchair)? 4  -SC (r) LB (t) SC (c)      Standing up from a chair using your arms (e.g., wheelchair, bedside chair)? 4  -SC (r) LB (t) SC (c)      Climbing 3-5 steps with a railing? 3  -SC (r) LB (t) SC (c)      To walk in hospital room? 3  -SC (r) LB (t) SC (c)      AM-PAC 6 Clicks Score 20  -SC (r) LB (t)      Functional Assessment    Outcome Measure Options AM-PAC 6 Clicks Basic Mobility (PT)  -SC (r) LB (t) SC (c)        User Key  (r) = Recorded By, (t) = Taken By, (c) = Cosigned By    Initials Name Provider Type    ASHLI Costa, PT Physical Therapist    KAYLAH Barbosa, PT Student PT Student           Time Calculation:         PT Charges       02/14/17 1513          Time Calculation    Start Time 1330  -SC (r) LB (t) SC (c)      PT Received On 02/14/17  -SC (r) LB (t) SC (c)      PT Goal Re-Cert Due Date 02/24/17  -SC (r) LB (t) SC (c)      Time Calculation- PT    Total Timed Code Minutes- PT 15 minute(s)  -SC (r) LB (t) SC (c)        User Key  (r) = Recorded By, (t) = Taken By, (c) = Cosigned By    Initials Name Provider Type    ASHLI Costa, PT  Physical Therapist    LB Berkley Barbosa, PT Student PT Student          Therapy Charges for Today     Code Description Service Date Service Provider Modifiers Qty    28409291370 HC PT THER PROC EA 15 MIN 2/14/2017 Berkley Barbosa, PT Student GP 1    30463838236 HC PT EVAL MOD COMPLEXITY 4 2/14/2017 Berkley Barbosa, PT Student GP 1          PT G-Codes  Outcome Measure Options: AM-PAC 6 Clicks Basic Mobility (PT)      Berkley Barbosa, PT Student  2/14/2017

## 2017-02-14 NOTE — ADDENDUM NOTE
Addendum  created 02/14/17 0918 by Viet Dickens MD    Anesthesia Intra Blocks edited, Child order released for a procedure order, LDA created via procedure documentation, Sign clinical note

## 2017-02-14 NOTE — PROGRESS NOTES
Orthopedic Daily Progress Note      CC: Shoulder pain    Pain somewhat controlled. Complained of 7/10 pain during the evening/early AM.  General: no fevers, chills  Abdomen: No nausea, vomiting, or diarrhea    No other complaints    Physical Exam:  I have reviewed the vital signs.  Temp:  [97.9 °F (36.6 °C)-100.2 °F (37.9 °C)] 100.2 °F (37.9 °C)  Heart Rate:  [] 103  Resp:  [16-20] 16  BP: (117-156)/(29-96) 141/90    Objective  General Appearance:    Alert, cooperative, no distress  Extremities: No clubbing, cyanosis, or edema to lower extremities  Pulses:  2+ in distal surgical extremity  Skin: Dressing Clean/dry/intact      Results Review:    I have reviewed the labs, radiology results and diagnostic studies:Yes      Results from last 7 days  Lab Units 02/14/17  0438   WBC 10*3/mm3 12.80*   HEMOGLOBIN g/dL 10.0*   PLATELETS 10*3/mm3 397       Results from last 7 days  Lab Units 02/14/17  0438   SODIUM mmol/L 137   POTASSIUM mmol/L 3.5   TOTAL CO2 mmol/L 28.0   CREATININE mg/dL 0.50*   GLUCOSE mg/dL 117*       I have reviewed the medications.    Assessment/Problem List  POD#  1 S/p Left reverse TSA for a proximal humerus fx     Plan  - PT exercises  - Adjust pain control  - Assess possibility of rehab at [facility]         Discharge Planning: I expect patient to be discharged to [facility] versus home in 1-2 days.    Alfredo Carr MD  02/14/17  7:48 AM

## 2017-02-14 NOTE — PLAN OF CARE
Problem: Perioperative Period (Adult)  Goal: Signs and Symptoms of Listed Potential Problems Will be Absent or Manageable (Perioperative Period)  Outcome: Ongoing (interventions implemented as appropriate)    02/14/17 0427   Perioperative Period   Problems Assessed (Perioperative Period) all   Problems Present (Perioperative Period) pain         02/14/17 0427   Perioperative Period   Problems Assessed (Perioperative Period) all   Problems Present (Perioperative Period) pain         Problem: Fall Risk (Adult)  Goal: Absence of Falls  Outcome: Ongoing (interventions implemented as appropriate)    02/14/17 0427   Fall Risk (Adult)   Absence of Falls making progress toward outcome

## 2017-02-14 NOTE — CONSULTS
"Hospital Medicine Consult    Date of Admission: 2/13/2017  Date of Consult: 02/13/17    Primary Care Physician: No Known Provider  Referring Physician:     Chief complaint/Reason for consultation: perioperative medical management    Subjective     History of Present Illness    Patient admitted for elective left reverse total shoulder arthroplasty.  2 weeks ago she experienced a fall with a 4 part left proximal humerus fracture, seen by Dr. Carcamo in his office and set up for planned operative intervention.  Currently patient's pain is well controlled, she is postop day #0 and has an interscalene anesthesia catheter in place.  Patient does have a history of difficult to control bipolar disorder and anxiety, she has not had her psychiatric medications since last evening's dose since she was nothing by mouth today for operative intervention.  She does state that she is starting to have \"swimmy feeling\" in her head which happens when she skips doses of her Wellbutrin, Lexapro and Geodon.  We have reinitiated her home medications for her first doses to be given now.  Patient currently states that her pain is well-controlled, she has no complaints.  Specifically she does not complain of chest tightness, chest pain, shortness of breath.    Review of Systems   Otherwise complete ROS is negative except as mentioned above.    Past Medical History:  has a past medical history of Anxiety; Arm pain, left; Arthritis; Balance problem; Bipolar disorder; Depression; GERD (gastroesophageal reflux disease); Migraine; Productive cough; Sleep apnea; and Wears glasses.    Past Surgical History:  has a past surgical history that includes Total knee arthroplasty (Right); Laparoscopic tubal ligation; Esophagogastroduodenoscopy; and Reverse total shoulder arthroplasty (Left).    Family History: family history is not on file.    Social History:  reports that she has been smoking Cigarettes.  She has a 40.00 pack-year smoking history. She has " never used smokeless tobacco. She reports that she does not drink alcohol or use illicit drugs.    Medications: Scheduled Meds:  amitriptyline 200 mg Oral Nightly   [START ON 2/14/2017] atorvastatin 80 mg Oral Daily   buPROPion 100 mg Oral Q8H   [START ON 2/14/2017] escitalopram 10 mg Oral Daily   gabapentin 800 mg Oral Q8H   sennosides-docusate sodium 2 tablet Oral BID   [START ON 2/14/2017] vancomycin 15 mg/kg Intravenous Once   ziprasidone 80 mg Oral BID With Meals     Continuous Infusions:  ropivacaine 6 mL/hr Last Rate: 2 mL/hr (02/13/17 1600)   sodium chloride 50 mL/hr Last Rate: 50 mL/hr (02/13/17 1847)     PRN Meds:.bisacodyl  •  clonazePAM  •  clonazePAM  •  docusate sodium  •  famotidine  •  HYDROmorphone **AND** naloxone  •  magnesium hydroxide  •  oxyCODONE-acetaminophen  Allergies   Allergen Reactions   • Ceclor [Cefaclor] Rash       Objective    Physical Exam:   Vital Signs have been reviewed  Physical Exam  Temp:  [97.9 °F (36.6 °C)-99.9 °F (37.7 °C)] 99.9 °F (37.7 °C)  Heart Rate:  [] 102  Resp:  [16-20] 16  BP: (117-156)/(29-96) 117/69  Constitutional: no acute distress, awake, alert  Eyes: PERRLA, sclerae anicteric, no conjunctival injection  Neck: supple, no thyromegaly, trachea midline  Respiratory: Clear to auscultation bilaterally, nonlabored respirations   Cardiovascular: RRR, no murmurs  Gastrointestinal: Positive bowel sounds, soft, nontender, nondistended  Musculoskeletal: No bilateral ankle edema, no clubbing or cyanosis to bilateral lower extremities. LUE   Psychiatric: oriented x 3, appropriate affect, cooperative    Results Reviewed:  I have personally reviewed current lab, radiology, and data and agree with results.    Results from last 7 days  Lab Units 02/10/17  1018   WBC 10*3/mm3 10.64   HEMOGLOBIN g/dL 11.2*   PLATELETS 10*3/mm3 355       Results from last 7 days  Lab Units 02/10/17  1018   SODIUM mmol/L 142   POTASSIUM mmol/L 4.5   TOTAL CO2 mmol/L 30.0   BUN mg/dL 16    CREATININE mg/dL 0.70   GLUCOSE mg/dL 92   CALCIUM mg/dL 9.4           Active Problems:    Closed 4-part fracture of proximal end of left humerus    Tenosynovitis of left upper arm    Sleep apnea    GERD (gastroesophageal reflux disease)    Bipolar disorder    Arthritis    Tobacco abuse        Assessment/Plan   Assessment & Plan    - POD #0 s/p left reverse total shoulder arthroplasty  - restart psychiatric and appropriate home meds  - nicotine patch  - BID prn famotidine for GERD  - currently with interscalene catheter for postop pain management, prn IV dilaudid and PO percocet    Carissa Arciniega MD  02/13/17  9:10 PM    Consults

## 2017-02-14 NOTE — PROGRESS NOTES
Discharge Planning Assessment  [facility]     Patient Name: Kayleigh Thompson  MRN: [id number]  Today's Date: 2/14/2017    Admit Date: 2/13/2017          Discharge Needs Assessment       02/14/17 1537    Living Environment    Lives With parent(s)    Living Arrangements condominium    Quality Of Family Relationships supportive    Able to Return to Prior Living Arrangements yes    Living Arrangement Comments Ms. Thompson lives with her 80 year old Mother in a single story condo, 10 steps, in [place].  She has a brother and sister in law that live close by that can assist her at home.    Discharge Needs Assessment    Readmission Within The Last 30 Days no previous admission in last 30 days    Equipment Currently Used at Home cane, straight    Equipment Needed After Discharge none    Transportation Available car;family or friend will provide    Discharge Disposition rehab facility            Discharge Plan       02/14/17 4564    Case Management/Social Work Plan    Plan [facility]    Patient/Family In Agreement With Plan yes    Additional Comments Met with Ms. Thompson at the bedside to discuss discharge planning.  Ms. Thompson is independent with her ADL's.  She uses a cane for ambulation.  She recently moved back to [place] from [place] and is planning on returning to her last PCP, Dr. Graham Sagastume.  No home health.  Ms. Thompson requested [facility] for inpatient rehab.  Referred Ms. Thompson to [place] at [facility].  [facility] will need to obtain precert from  [place].  If not approved for [facility], Ms. Thompson was agreeable to home with [facility].   CM will follow up.        Discharge Placement     No information found                Demographic Summary       02/14/17 1528    Referral Information    Admission Type inpatient    Arrived From admitted as an inpatient    Reason For Consult discharge planning    Record Reviewed clinical discipline documentation;history and physical;medical record     Contact Information    Permission Granted to Share Information With     Primary Care Physician Information    Name Will likely return to Graham Sagastume MD as PCP, if he is accepting patients.            Functional Status       02/14/17 1532    Functional Status Prior    Ambulation 0-->independent    Transferring 0-->independent    Toileting 0-->independent    Bathing 0-->independent    Dressing 0-->independent    Eating 0-->independent    Communication 0-->understands/communicates without difficulty    Swallowing 0-->swallows foods/liquids without difficulty    IADL    Medications independent    Meal Preparation independent    Housekeeping independent    Laundry independent    Shopping independent    Oral Care independent    Activity Tolerance    Current Activity Limitations --   See PT notes    Usual Activity Tolerance good    Cognitive/Perceptual/Developmental    Current Mental Status/Cognitive Functioning no deficits noted    Recent Changes in Mental Status/Cognitive Functioning no changes    Employment/Financial    Employment/Finance Comments Ms. Thompson has prescription drug coverage with [employer].  Verified insurance with patient.  She fills scripts at [facility] off [address]            Psychosocial     None            Abuse/Neglect     None            Legal     None            Substance Abuse       02/14/17 1535    Substance Use, Patient    Substance Use current tobacco use    Tobacco Type cigarettes, tobacco    Number of Cigarette Packs per Day 1    Years of Cigarette Use 40    Cigarette Pack Years 40    Reported Level Of Alcohol Use social            Patient Forms     None          Daysi Rawls

## 2017-02-14 NOTE — ANESTHESIA PROCEDURE NOTES
Peripheral Block    Patient location during procedure: pre-op  Start time: 2/14/2017 9:02 AM  Stop time: 2/14/2017 9:15 AM  Reason for block: at surgeon's request and post-op pain management  Performed by  Anesthesiologist: GENE BRAY  Preanesthetic Checklist  Completed: patient identified, site marked, surgical consent, pre-op evaluation, timeout performed, IV checked, risks and benefits discussed and monitors and equipment checked  Peripheral Block Prep:  Sterile barriers:cap, gloves, mask and sterile barriers  Prep: ChloraPrep  Patient monitoring: blood pressure monitoring, continuous pulse oximetry and EKG  Peripheral Procedure  Sedation:yes  Guidance:ultrasound guided  Images:still images obtained  Laterality:leftBlock Type:interscalene  Injection Technique:catheterNeedle Type:Tuohy  Needle Gauge:18 G  Catheter Size:20 G (20g)  Medications  Local Injected:ropivacaine 0.5% without epinephrine Local Amount Injected:15mL  Post Assessment  Patient Tolerance:comfortable throughout block  Complications:no  Additional Notes  Procedure:                Catheter at skin-6cm                                      Anesthesia was provide by opioids from floor      The pt was placed in semifowlers position with a slight tilt of the thorax contralateral to the insertion site.  The Insertion Site was prepped and draped in sterile fashion.  The skin was anesthetized with Lidocaine 1% 1ml injection utilizing a 25g needle.  Utilizing ultrasound guidance, a BBraun 2 inch 18 g Contiplex echogenic touhy needle was advanced in-plane.  Hydro dissection of tissue was achieved with Normal saline. Major vessels(carotid and Internal Jugular) where visualized as the brachial plexus was approached at the approximate level of C-7/ T-1.  Cervical 5 and Branches of Cervical 6 nerve roots where visualized and the needle tip was placed posterior at the level of C-6 roots.  LA spread was visualized and injection was made incrementally  every 5 mls with aspiration. Injection pressure was normal or little, there was no intraneural injection, no vascular injection.      The BBraun 20 g wire stylet  catheter was then placed under US guidance on the posterior aspect of the Brachial Plexus.  Location of catheter was confirmed with NS injection visualized with US . The tuohy was then removed and the skin was sealed with Skin AFix at catheter insertion site.  Skin was prepped with mastisol and the labeled catheter  was secured with steristrips and a CHG tegaderm. Thank You.

## 2017-02-14 NOTE — PROGRESS NOTES
[facility]    Acute pain service Inpatient Progress Note    Patient Name: Kayleigh Thompson  :  1960  MRN:  [id number]        Acute Pain  Service Inpatient Progress Note:    Pain Score:7/10  LOC: asleep but arousable  Resp Status: room air  Cardiac: VS stable  Side Effects:None  Catheter Site:clean, dressing intact and dry  Cath type: peripheral nerve cath with ON Q  Infusion rate: 8ml/hr  Catheter Plan:Catheter to remain Insitu and Continue catheter infusion rate unchanged  Comments: Bolus 10ml 0.2 % rop. Pt asleep . Complains of aching shoulder. Moves fingers. Receiving PO/IV pain meds during night.

## 2017-02-14 NOTE — PROGRESS NOTES
"      HOSPITALIST DAILY PROGRESS NOTE    Chief Complaint: f/u left reverse total shoulder arthroplasty    Subjective   SUBJECTIVE/OVERNIGHT EVENTS   Patient seen this afternoon, she is working with PT/OT. Having some pain in her left shoulder as she is working with them. Had temp of 101 today. Says she is having some burning with urine. Denies cough or dyspnea, but has noticed she is not doing as well on her incentive spirometry as she had been doing.     Review of Systems:  Gen-no fevers, no chills  CV-no chest pain, no palpitations  Resp-no cough, no dyspnea  GI-no N/V/D, no abd pain    Objective   OBJECTIVE   I have reviewed the vital signs.  Visit Vitals   • /79   • Pulse 120   • Temp 98.4 °F (36.9 °C) (Oral)   • Resp 17   • Ht 66\" (167.6 cm)   • Wt 160 lb (72.6 kg)   • SpO2 94%   • BMI 25.82 kg/m2       Physical Exam:  Gen-no acute distress  CV-RRR, S1 S2 normal, no m/r/g  Resp-CTAB, no wheezes  Abd-soft, NT, ND, +BS  Ext-no edema; LUE pain with movement  Neuro-A&Ox3, no focal deficits  Psych-appropriate mood    Results:  I have reviewed the labs, culture data, radiology results, and diagnostic studies.      Results from last 7 days  Lab Units 02/14/17  0438 02/10/17  1018   WBC 10*3/mm3 12.80* 10.64   HEMOGLOBIN g/dL 10.0* 11.2*   HEMATOCRIT % 31.0* 34.5   PLATELETS 10*3/mm3 397 355       Results from last 7 days  Lab Units 02/14/17  0438   SODIUM mmol/L 137   POTASSIUM mmol/L 3.5   CHLORIDE mmol/L 103   TOTAL CO2 mmol/L 28.0   BUN mg/dL 16   CREATININE mg/dL 0.50*   GLUCOSE mg/dL 117*   CALCIUM mg/dL 9.6       Culture Data:  Cultures:           Radiology Results:  Imaging Results (last 24 hours)     Procedure Component Value Units Date/Time    XR Chest PA & Lateral [[id number]]      Updated:  02/14/17 1443    XR Shoulder 2+ View Left [[id number]] Collected:  02/14/17 1212     Updated:  02/14/17 1522    Narrative:       EXAMINATION: XR SHOULDER 2+ VW LEFT-      INDICATION: Postop.      COMPARISON: None.   "   FINDINGS: A left reverse shoulder replacement has been performed.  Hardware is well positioned. The alignment is excellent and the native  bone structures are intact.           Impression:       1. Status post reverse left shoulder replacement.  2. There is mild fragmentation of the upper left humerus. Overall  alignment is excellent and the bone integrity is intact.     D:  02/14/2017  E:  02/14/2017     This report was finalized on 2/14/2017 3:20 PM by Dr. Emerson Low MD.             I have reviewed the medications.      Assessment/Plan   ASSESSMENT/PLAN    Principal Problem:    S/p left reverse total shoulder arthroplasty  Active Problems:    Fever    Closed 4-part fracture of proximal end of left humerus    Tenosynovitis of left upper arm    Sleep apnea    GERD (gastroesophageal reflux disease)    Bipolar disorder    Arthritis    Tobacco abuse    57 yo F with hx of GERD, bipolar disorder, and sleep apnea presents for scheduled left reverse total shoulder arthroplasty.     PLAN:  --Fever today. Check UA and CXR. Encouraged incentive spirometry.  --Continue PT/OT, pain control. Dr. Carcamo following.   --CM making referral for [facility].    More than 50% of time spent counseling on current illness and plan of care. Case discussed with: patient  Total time of the encounter was 35 minutes.    I expect patient to be discharged in: 2 days    Lu Moreira MD  02/14/17  4:31 PM

## 2017-02-15 LAB
ANION GAP SERPL CALCULATED.3IONS-SCNC: 2 MMOL/L (ref 3–11)
BUN BLD-MCNC: 8 MG/DL (ref 9–23)
BUN/CREAT SERPL: 16 (ref 7–25)
CALCIUM SPEC-SCNC: 9.4 MG/DL (ref 8.7–10.4)
CHLORIDE SERPL-SCNC: 105 MMOL/L (ref 99–109)
CO2 SERPL-SCNC: 29 MMOL/L (ref 20–31)
CREAT BLD-MCNC: 0.5 MG/DL (ref 0.6–1.3)
DEPRECATED RDW RBC AUTO: 53.3 FL (ref 37–54)
ERYTHROCYTE [DISTWIDTH] IN BLOOD BY AUTOMATED COUNT: 14.9 % (ref 11.3–14.5)
GFR SERPL CREATININE-BSD FRML MDRD: 127 ML/MIN/1.73
GLUCOSE BLD-MCNC: 110 MG/DL (ref 70–100)
HCT VFR BLD AUTO: 31.4 % (ref 34.5–44)
HGB BLD-MCNC: 9.8 G/DL (ref 11.5–15.5)
MCH RBC QN AUTO: 30.6 PG (ref 27–31)
MCHC RBC AUTO-ENTMCNC: 31.2 G/DL (ref 32–36)
MCV RBC AUTO: 98.1 FL (ref 80–99)
PLATELET # BLD AUTO: 368 10*3/MM3 (ref 150–450)
PMV BLD AUTO: 8.3 FL (ref 6–12)
POTASSIUM BLD-SCNC: 3.9 MMOL/L (ref 3.5–5.5)
POTASSIUM BLD-SCNC: 4 MMOL/L (ref 3.5–5.5)
RBC # BLD AUTO: 3.2 10*6/MM3 (ref 3.89–5.14)
SODIUM BLD-SCNC: 136 MMOL/L (ref 132–146)
WBC NRBC COR # BLD: 11.4 10*3/MM3 (ref 3.5–10.8)

## 2017-02-15 PROCEDURE — 25010000002 HYDROMORPHONE PER 4 MG: Performed by: ORTHOPAEDIC SURGERY

## 2017-02-15 PROCEDURE — 99232 SBSQ HOSP IP/OBS MODERATE 35: CPT | Performed by: FAMILY MEDICINE

## 2017-02-15 PROCEDURE — 85027 COMPLETE CBC AUTOMATED: CPT | Performed by: HOSPITALIST

## 2017-02-15 PROCEDURE — 94799 UNLISTED PULMONARY SVC/PX: CPT

## 2017-02-15 PROCEDURE — 80048 BASIC METABOLIC PNL TOTAL CA: CPT | Performed by: HOSPITALIST

## 2017-02-15 PROCEDURE — 97530 THERAPEUTIC ACTIVITIES: CPT | Performed by: OCCUPATIONAL THERAPIST

## 2017-02-15 RX ORDER — HYDROMORPHONE HYDROCHLORIDE 1 MG/ML
0.5 INJECTION, SOLUTION INTRAMUSCULAR; INTRAVENOUS; SUBCUTANEOUS EVERY 6 HOURS PRN
Status: DISCONTINUED | OUTPATIENT
Start: 2017-02-15 | End: 2017-02-17 | Stop reason: HOSPADM

## 2017-02-15 RX ORDER — OXYCODONE AND ACETAMINOPHEN 10; 325 MG/1; MG/1
2 TABLET ORAL EVERY 4 HOURS PRN
Status: DISCONTINUED | OUTPATIENT
Start: 2017-02-15 | End: 2017-02-17 | Stop reason: HOSPADM

## 2017-02-15 RX ORDER — NALOXONE HCL 0.4 MG/ML
0.1 VIAL (ML) INJECTION
Status: DISCONTINUED | OUTPATIENT
Start: 2017-02-15 | End: 2017-02-17 | Stop reason: HOSPADM

## 2017-02-15 RX ORDER — FLUCONAZOLE 150 MG/1
150 TABLET ORAL ONCE
Status: COMPLETED | OUTPATIENT
Start: 2017-02-15 | End: 2017-02-15

## 2017-02-15 RX ORDER — FAMOTIDINE 10 MG/ML
20 INJECTION, SOLUTION INTRAVENOUS 2 TIMES DAILY
Status: CANCELLED | OUTPATIENT
Start: 2017-02-15

## 2017-02-15 RX ADMIN — BUPROPION HYDROCHLORIDE 100 MG: 100 TABLET, FILM COATED ORAL at 21:40

## 2017-02-15 RX ADMIN — DOCUSATE SODIUM AND SENNOSIDES 2 TABLET: 8.6; 5 TABLET, FILM COATED ORAL at 18:04

## 2017-02-15 RX ADMIN — MICONAZOLE NITRATE 1 APPLICATOR: 20 CREAM VAGINAL at 22:26

## 2017-02-15 RX ADMIN — BUPROPION HYDROCHLORIDE 100 MG: 100 TABLET, FILM COATED ORAL at 18:04

## 2017-02-15 RX ADMIN — HYDROMORPHONE HYDROCHLORIDE 0.5 MG: 1 INJECTION, SOLUTION INTRAMUSCULAR; INTRAVENOUS; SUBCUTANEOUS at 02:28

## 2017-02-15 RX ADMIN — ESCITALOPRAM OXALATE 10 MG: 10 TABLET ORAL at 09:53

## 2017-02-15 RX ADMIN — BUPROPION HYDROCHLORIDE 100 MG: 100 TABLET, FILM COATED ORAL at 05:31

## 2017-02-15 RX ADMIN — CLONAZEPAM 2 MG: 1 TABLET ORAL at 23:59

## 2017-02-15 RX ADMIN — CLONAZEPAM 2 MG: 1 TABLET ORAL at 10:02

## 2017-02-15 RX ADMIN — ZIPRASIDONE HYDROCHLORIDE 80 MG: 20 CAPSULE ORAL at 09:54

## 2017-02-15 RX ADMIN — FAMOTIDINE 20 MG: 20 TABLET ORAL at 15:13

## 2017-02-15 RX ADMIN — HYDROMORPHONE HYDROCHLORIDE 0.5 MG: 1 INJECTION, SOLUTION INTRAMUSCULAR; INTRAVENOUS; SUBCUTANEOUS at 21:41

## 2017-02-15 RX ADMIN — HYDROMORPHONE HYDROCHLORIDE 0.5 MG: 1 INJECTION, SOLUTION INTRAMUSCULAR; INTRAVENOUS; SUBCUTANEOUS at 05:31

## 2017-02-15 RX ADMIN — GABAPENTIN 800 MG: 400 CAPSULE ORAL at 14:22

## 2017-02-15 RX ADMIN — OXYCODONE HYDROCHLORIDE AND ACETAMINOPHEN 2 TABLET: 10; 325 TABLET ORAL at 15:15

## 2017-02-15 RX ADMIN — OXYCODONE HYDROCHLORIDE AND ACETAMINOPHEN 2 TABLET: 10; 325 TABLET ORAL at 19:05

## 2017-02-15 RX ADMIN — ACETAMINOPHEN 650 MG: 325 TABLET, FILM COATED ORAL at 05:31

## 2017-02-15 RX ADMIN — ATORVASTATIN CALCIUM 80 MG: 40 TABLET, FILM COATED ORAL at 09:54

## 2017-02-15 RX ADMIN — GABAPENTIN 800 MG: 400 CAPSULE ORAL at 21:40

## 2017-02-15 RX ADMIN — OXYCODONE HYDROCHLORIDE AND ACETAMINOPHEN 2 TABLET: 10; 325 TABLET ORAL at 10:02

## 2017-02-15 RX ADMIN — DOCUSATE SODIUM AND SENNOSIDES 2 TABLET: 8.6; 5 TABLET, FILM COATED ORAL at 09:53

## 2017-02-15 RX ADMIN — NICOTINE 1 PATCH: 14 PATCH TRANSDERMAL at 21:41

## 2017-02-15 RX ADMIN — HYDROMORPHONE HYDROCHLORIDE 0.5 MG: 1 INJECTION, SOLUTION INTRAMUSCULAR; INTRAVENOUS; SUBCUTANEOUS at 13:36

## 2017-02-15 RX ADMIN — OXYCODONE HYDROCHLORIDE AND ACETAMINOPHEN 1 TABLET: 10; 325 TABLET ORAL at 05:40

## 2017-02-15 RX ADMIN — ZIPRASIDONE HYDROCHLORIDE 80 MG: 20 CAPSULE ORAL at 19:05

## 2017-02-15 RX ADMIN — FLUCONAZOLE 150 MG: 150 TABLET ORAL at 11:51

## 2017-02-15 RX ADMIN — AMITRIPTYLINE HYDROCHLORIDE 200 MG: 50 TABLET, FILM COATED ORAL at 21:40

## 2017-02-15 RX ADMIN — GABAPENTIN 800 MG: 400 CAPSULE ORAL at 05:31

## 2017-02-15 NOTE — PROGRESS NOTES
Orthopedic Daily Progress Note      CC: s/p Left shoulder surgery     Patient and nurses report pain only somewhat controlled. Required re-boluses of nerve block yesterday as well as IV pain medication. Patient was resting comfortably on entry into room.  General: no fevers, chills  Abdomen: No nausea, vomiting, or diarrhea    No other complaints    Physical Exam:  I have reviewed the vital signs.  Temp:  [97.8 °F (36.6 °C)-101.3 °F (38.5 °C)] 98.9 °F (37.2 °C)  Heart Rate:  [104-120] 112  Resp:  [17-18] 18  BP: (116-141)/(72-87) 141/87    Objective  General Appearance:    Alert, cooperative, no distress  Extremities: No clubbing, cyanosis, or edema to lower extremities  Pulses:  2+ in distal surgical extremity  Skin: Dressing Clean/dry/intact      Results Review:    I have reviewed the labs, radiology results and diagnostic studies:Yes      Results from last 7 days  Lab Units 02/15/17  0502   WBC 10*3/mm3 11.40*   HEMOGLOBIN g/dL 9.8*   PLATELETS 10*3/mm3 368       Results from last 7 days  Lab Units 02/15/17  0502   SODIUM mmol/L 136   POTASSIUM mmol/L 4.0   TOTAL CO2 mmol/L 29.0   CREATININE mg/dL 0.50*   GLUCOSE mg/dL 110*       I have reviewed the medications.    Assessment/Problem List  POD# 2 S/p left reverse TSA for proximal humerus fracture    Plan  - Pain control: appreciate pain team help. Will adjust PO meds and decrease IV meds  - Continue PT exercises  - Incentive spirometry  - D/c planning      Discharge Planning: I expect patient to be discharged to home versus [facility] in 1-2 days.    Alfredo Carr MD  02/15/17  7:27 AM

## 2017-02-15 NOTE — PLAN OF CARE
Problem: Fall Risk (Adult)  Goal: Identify Related Risk Factors and Signs and Symptoms  Outcome: Ongoing (interventions implemented as appropriate)    02/14/17 8873   Fall Risk   Fall Risk: Related Risk Factors other (see comments)  (medications and nerve block)   Fall Risk: Signs and Symptoms presence of risk factors

## 2017-02-15 NOTE — PROGRESS NOTES
[facility]    Acute pain service Inpatient Progress Note    Patient Name: Kayleigh Thompson  :  1960  MRN:  [id number]        Acute Pain  Service Inpatient Progress Note:    Analgesia:Good  Pain Score:4/10  LOC: alert and awake  Resp Status: room air  Cardiac: VS stable  Side Effects:None  Catheter Site:clean  Cath type: peripheral nerve cath with ON Q  Infusion rate: 6ml/hr  Catheter Plan:Catheter to remain Insitu and Continue catheter infusion rate unchanged  Comments: Patient has excellent pain control in the shoulder, she complains of pain in the forearm. The interscalene block will not cover the forearm.  Spoke with Dr Carcamo about pain mgt plan, he is adjusting her PO pain meds.

## 2017-02-15 NOTE — PROGRESS NOTES
Acute Care - Occupational Therapy Treatment Note  [facility]     Patient Name: Kayleigh Thompson  : 1960  MRN: [id number]  Today's Date: 2/15/2017  Onset of Illness/Injury or Date of Surgery Date: 17  Date of Referral to OT: 17  Referring Physician: Dr. Carcamo      Admit Date: 2017    Visit Dx:     ICD-10-CM ICD-9-CM   1. Impaired functional mobility, balance, gait, and endurance Z74.09 V49.89   2. Impaired mobility and ADLs Z74.09 799.89     Patient Active Problem List   Diagnosis   • Closed 4-part fracture of proximal end of left humerus   • Tenosynovitis of left upper arm   • Sleep apnea   • GERD (gastroesophageal reflux disease)   • Bipolar disorder   • Arthritis   • Tobacco abuse   • Fever   • S/p left reverse total shoulder arthroplasty             Adult Rehabilitation Note       02/15/17 1130          Rehab Assessment/Intervention    Discipline occupational therapist  -SK      Document Type therapy note (daily note)  -SK      Subjective Information no complaints;agree to therapy  -SK      Patient Effort, Rehab Treatment good  -SK      Symptoms Noted During/After Treatment increased pain  -SK      Symptoms Noted Comment pt was pre-medicated  -SK      Precautions/Limitations non-weight bearing status   interscalene nerve cath L UE; ROM per MD protocol  -SK      Recorded by [SK] RACHEL Hussein      Pain Assessment    Pain Assessment 0-10  -SK      Pain Score 3  -SK      Post Pain Score 6  -SK      Pain Type Acute pain;Surgical pain  -SK      Pain Location Shoulder  -SK      Pain Orientation Left  -SK      Pain Intervention(s) --   pre-medicated; nerve cath  -SK      Response to Interventions tolerated  -SK      Recorded by [SK] RACHEL Hussein      Cognitive Assessment/Intervention    Current Cognitive/Communication Assessment functional  -SK      Orientation Status oriented x 4  -SK      Follows Commands/Answers Questions 100% of the time  -SK      Personal Safety WNL/WFL   -SK      Personal Safety Interventions fall prevention program maintained  -SK      Recorded by [SK] Meliza De La Cruz OTR      Mobility Assessment/Training    Extremity Weight-Bearing Status left upper extremity  -SK      Left Upper Extremity Weight-Bearing non weight-bearing  -SK      Recorded by [SK] Meliza De La Cruz OTR      Lower Body Bathing Assessment/Training    LB Bathing Assess/Train, Comment OT completed education on axillary care   -SK      Recorded by [SK] Meliza De La Cruz OTR      Upper Body Dressing Assessment/Training    UB Dressing Assess/Train, Clothing Type doffing:;donning:   sling  -SK      UB Dressing Assess/Train, Assist Device alley technique  -SK      UB Dressing Assess/Train, Position supine  -SK      UB Dressing Assess/Train, [place] maximum assist (25% patient effort)  -SK      UB Dressing Assess/Train, Impairments ROM decreased;pain  -SK      UB Dressing Assess/Train, Comment reinforced education of donning/doffing sling and no AROM shoulder for assistance  -SK      Recorded by [SK] Meliza De La Cruz OTR      Therapy Exercises    Left Upper Extremity PROM:;AAROM:;AROM:;10 reps;supine;elbow flexion/extension;hand pumps;pronation/supination;shoulder ER/IR;other reps   AROM digit, wrist, elbow F/E, pronation/supination...  -SK      LUE Other Reps --   AROM scap retractions, PROM IR to chest, ER to 30....  -SK      LUE Resistance --   FE to ~140 d/t pain tolerance  -SK      Bilateral Upper Extremity --   AAROM req. on elbow f/e to iniaite then progressed to arom  -SK      Recorded by [SK] RACHEL Hussein      Positioning and Restraints    Pre-Treatment Position in bed  -SK      Post Treatment Position bed  -SK      In Bed supine;call light within reach;encouraged to call for assist;with nsg   L sling in place, pillow for support, ice to incision site  -SK      Recorded by [SK] RACHEL Hussein        User Key  (r) = Recorded By, (t) = Taken By, (c) = Cosigned By    Initials  Name Effective Dates    MERARY De La Cruz, OTR 05/18/15 -                 OT Goals       02/15/17 1130 02/14/17 1712       Transfer Training OT LTG    Transfer Training OT LTG, Date Established  02/14/17  -AR     Transfer Training OT LTG, Time to Achieve  1 wk  -AR     Transfer Training OT LTG, Activity Type  sit to stand/stand to sit;toilet  -AR     Transfer Training OT LTG, [place] Level  supervision required;verbal cues required  -AR     Transfer Training OT LTG, Assist Device  cane, straight  -AR     Transfer Training OT LTG, Outcome goal ongoing  -SK      Range of Motion OT LTG    Range of Motion Goal OT LTG, Date Established  02/14/17  -AR     Range of Motion Goal OT LTG, Time to Achieve  1 wk  -AR     Range of Motion Goal OT LTG, AROM Measure  Pt will particiapte in LUE HEP within physician parameters daily to support ADL function   -AR     Range of Motion Goal OT LTG, Outcome goal ongoing   pt progressing  -SK      Patient Education OT LTG    Patient Education OT LTG, Date Established  02/14/17  -AR     Patient Education OT LTG, Time to Achieve  1 wk  -AR     Patient Education OT LTG, Education Type  written program;HEP;precautions per surgeon;1 hand/alley technique  -AR     Patient Education OT LTG, Education Understanding  demonstrates adequately;verbalizes understanding  -AR     Patient Education OT LTG Outcome goal ongoing   progressing  -SK      UB Dressing OT LTG    UB Dressing Goal OT LTG, Date Established  02/14/17  -AR     UB Dressing Goal OT LTG, Time to Achieve  1 wk  -AR     UB Dressing Goal OT LTG, Activity Type  Pt will don/doff LUE sling  -AR     UB Dressing Goal OT LTG, [place] Level  verbal cues required;moderate assist (50% patient effort)  -AR     UB Dressing Goal OT LTG, Outcome goal ongoing  -SK        User Key  (r) = Recorded By, (t) = Taken By, (c) = Cosigned By    Initials Name Provider Type    MERARY De La Cruz, OTR Occupational Therapist    BEBETO Agrawal, OT  Occupational Therapist          Occupational Therapy Education     Title: PT OT SLP Therapies (Active)     Topic: Occupational Therapy (Done)     Point: ADL training (Done)    Description: Instruct learner(s) on proper safety adaptation and remediation techniques during self care or transfers.   Instruct in proper use of assistive devices.    Learning Progress Summary    Learner Readiness Method Response Comment Documented by Status   Patient Acceptance LYNDON BRIDGES D VU, DU  SK 02/15/17 1337 Done    Eager CYRUS HANEY D,H GONZALES TO Reviewed left shoulder precautions, ADL retraining to maintain, care of ON-Q ball during ADL activities, LUE HEP, NWB LUE, bed mobility, transfer training, DC recommendation of rehab AR 02/14/17 1710 Done               Point: Home exercise program (Done)    Description: Instruct learner(s) on appropriate technique for monitoring, assisting and/or progressing therapeutic exercises/activities.    Learning Progress Summary    Learner Readiness Method Response Comment Documented by Status   Patient Acceptance LYNDON BRIDGES D VU, DU  SK 02/15/17 1337 Done    Eager CYRUS HANEY D,H GONZALES TO Reviewed left shoulder precautions, ADL retraining to maintain, care of ON-Q ball during ADL activities, LUE HEP, NWB LUE, bed mobility, transfer training, DC recommendation of rehab AR 02/14/17 1710 Done               Point: Precautions (Done)    Description: Instruct learner(s) on prescribed precautions during self-care and functional transfers.    Learning Progress Summary    Learner Readiness Method Response Comment Documented by Status   Patient Eager E,AMIE BRIDGES,H GONZALES TO Reviewed left shoulder precautions, ADL retraining to maintain, care of ON-Q ball during ADL activities, LUE HEP, NWB LUE, bed mobility, transfer training, DC recommendation of rehab AR 02/14/17 1710 Done               Point: Body mechanics (Done)    Description: Instruct learner(s) on proper positioning and spine alignment during self-care, functional mobility activities and/or  exercises.    Learning Progress Summary    Learner Readiness Method Response Comment Documented by Status   Patient Eager E,TB,D,H ZULEIKA,GONZALES Reviewed left shoulder precautions, ADL retraining to maintain, care of ON-Q ball during ADL activities, LUE HEP, NWB LUE, bed mobility, transfer training, DC recommendation of rehab AR 02/14/17 1710 Done                      User Key     Initials Effective Dates Name Provider Type Discipline    SK 05/18/15 -  Meliza De La Cruz, OTR Occupational Therapist OT    AR 06/22/15 -  Marlena Agrawal, OT Occupational Therapist OT                  OT Recommendation and Plan  Anticipated Discharge Disposition: inpatient rehabilitation facility  Therapy Frequency: daily (per priority policy)  Plan of Care Review  Plan Of Care Reviewed With: patient  Outcome Summary/Follow up Plan: pt tolerated HEP per MD protocol with appropriate teach back; still unable to assist with sling donning/doffing d/t pain and decreased ROM; pending d/c to [facility] 2/16/2017        Outcome Measures       02/15/17 1130 02/14/17 1528 02/14/17 1330    How much help from another person do you currently need...    Turning from your back to your side while in flat bed without using bedrails?   3  -SC (r) LB (t) SC (c)    Moving from lying on back to sitting on the side of a flat bed without bedrails?   3  -SC (r) LB (t) SC (c)    Moving to and from a bed to a chair (including a wheelchair)?   4  -SC (r) LB (t) SC (c)    Standing up from a chair using your arms (e.g., wheelchair, bedside chair)?   4  -SC (r) LB (t) SC (c)    Climbing 3-5 steps with a railing?   3  -SC (r) LB (t) SC (c)    To walk in hospital room?   3  -SC (r) LB (t) SC (c)    AM-PAC 6 Clicks Score   20  -SC (r) LB (t)    How much help from another is currently needed...    Putting on and taking off regular lower body clothing? 1  -SK 1  -AR     Bathing (including washing, rinsing, and drying) 2  -SK 2  -AR     Toileting (which includes using toilet bed pan  or urinal) 3  -SK 3  -AR     Putting on and taking off regular upper body clothing 2  -SK 2  -AR     Taking care of personal grooming (such as brushing teeth) 3  -SK 3  -AR     Eating meals 3  -SK 3  -AR     Score 14  -SK 14  -AR     Functional Assessment    Outcome Measure Options  AM-PAC 6 Clicks Daily Activity (OT)  -AR AM-PAC 6 Clicks Basic Mobility (PT)  -SC (r) LB (t) SC (c)      User Key  (r) = Recorded By, (t) = Taken By, (c) = Cosigned By    Initials Name Provider Type    SC Susan Costa, PT Physical Therapist    SK Meliza De La Cruz, OTR Occupational Therapist    AR Marlena Agrawal, OT Occupational Therapist    KAYLAH Barbosa, PT Student PT Student           Time Calculation:         Time Calculation- OT       02/15/17 1347          Time Calculation- OT    OT Start Time 1130  -SK      Total Timed Code Minutes- OT 23 minute(s)  -SK      OT Received On 02/15/17  -SK      OT Goal Re-Cert Due Date 02/24/17  -SK        User Key  (r) = Recorded By, (t) = Taken By, (c) = Cosigned By    Initials Name Provider Type    SK Meliza De La Cruz, OTR Occupational Therapist           Therapy Charges for Today     Code Description Service Date Service Provider Modifiers Qty    [id number]  OT THERAPEUTIC ACT EA 15 MIN 2/15/2017 RACHEL Hussein GO 2               RACHEL Villalba  2/15/2017

## 2017-02-15 NOTE — PLAN OF CARE
Problem: Patient Care Overview (Adult)  Goal: Plan of Care Review  Outcome: Ongoing (interventions implemented as appropriate)    02/15/17 1130   Coping/Psychosocial Response Interventions   Plan Of Care Reviewed With patient   Outcome Evaluation   Outcome Summary/Follow up Plan pt tolerated HEP per MD protocol with appropriate teach back; still unable to assist with sling donning/doffing d/t pain and decreased ROM; pending d/c to [facility] 2/16/2017         Problem: Inpatient Occupational Therapy  Goal: Transfer Training Goal 1 LTG- OT  Outcome: Ongoing (interventions implemented as appropriate)    02/14/17 1712 02/15/17 1130   Transfer Training OT LTG   Transfer Training OT LTG, Date Established 02/14/17 --    Transfer Training OT LTG, Time to Achieve 1 wk --    Transfer Training OT LTG, Activity Type sit to stand/stand to sit;toilet --    Transfer Training OT LTG, [place] Level supervision required;verbal cues required --    Transfer Training OT LTG, Assist Device cane, straight --    Transfer Training OT LTG, Outcome --  goal ongoing       Goal: Range of Motion Goal LTG- OT  Outcome: Ongoing (interventions implemented as appropriate)    02/14/17 1712 02/15/17 1130   Range of Motion OT LTG   Range of Motion Goal OT LTG, Date Established 02/14/17 --    Range of Motion Goal OT LTG, Time to Achieve 1 wk --    Range of Motion Goal OT LTG, AROM Measure Pt will particiapte in LUE HEP within physician parameters daily to support ADL function  --    Range of Motion Goal OT LTG, Outcome --  goal ongoing  (pt progressing)       Goal: Patient Education Goal LTG- OT  Outcome: Ongoing (interventions implemented as appropriate)    02/14/17 1712 02/15/17 1130   Patient Education OT LTG   Patient Education OT LTG, Date Established 02/14/17 --    Patient Education OT LTG, Time to Achieve 1 wk --    Patient Education OT LTG, Education Type written program;HEP;precautions per surgeon;1 hand/alley technique --    Patient Education OT  LTG, Education Understanding demonstrates adequately;verbalizes understanding --    Patient Education OT LTG Outcome --  goal ongoing  (progressing)       Goal: UB Dressing Goal LTG- OT  Outcome: Ongoing (interventions implemented as appropriate)    02/14/17 1712 02/15/17 1130   UB Dressing OT LTG   UB Dressing Goal OT LTG, Date Established 02/14/17 --    UB Dressing Goal OT LTG, Time to Achieve 1 wk --    UB Dressing Goal OT LTG, Activity Type Pt will don/doff LUE sling --    UB Dressing Goal OT LTG, [place] Level verbal cues required;moderate assist (50% patient effort) --    UB Dressing Goal OT LTG, Outcome --  goal ongoing

## 2017-02-15 NOTE — PLAN OF CARE
Problem: Patient Care Overview (Adult)  Goal: Plan of Care Review  Outcome: Ongoing (interventions implemented as appropriate)    02/14/17 1901   Coping/Psychosocial Response Interventions   Plan Of Care Reviewed With patient   Outcome Evaluation   Outcome Summary/Follow up Plan Patient is still having pain rated at 7-8 often. Patient has lots of pain when moving the left arm.    Patient Care Overview   Progress no change

## 2017-02-15 NOTE — PROGRESS NOTES
[facility] Medicine Services    ASSESSMENT / PLAN               55 yo F with hx of GERD, bipolar disorder, and sleep apnea presents for scheduled left reverse total shoulder arthroplasty.   S/p left reverse total shoulder arthroplasty, postoperative day 2, pain control per anesthesia, discharge planning    Fever, transient, brief yesterday afternoon, urinalysis and chest x-ray with no acute findings, monitor, afebrile now mild leukocytosis yesterday but probably reactive better today, monitor for any further fever episodes,    Candida vulvovaginitis  ,  will order 1 dose of Diflucan with vaginal cream  Closed 4-part fracture of proximal end of left humerus, as above  Tenosynovitis of left upper arm  Sleep apnea  GERD (gastroesophageal reflux disease), on famotidine  Bipolar disorder, continue home meds  Arthritis  Tobacco abuse, nicotine patch  Counseled   Encouraged incentive spirometry.  --Continue PT/OT, pain control. Dr. Carcamo following.   --CM making referral for [facility].  Stop the nasal cannula oxygen patient is maintaining 93-96% oxygen saturation at room air   --Highly complex set of issues with high risk for further serious morbidity and other serious sequela, Time spent >25 minutes with > 50% time spent in the room face to face with patient and relatives       Expected Discharge disposition in      1-2      Days to rehab     SUBJECTIVE--HPI/ Events overnight / CC- Hospital Follow up visit/ ROS-not detailed ,  as performed below    Denies any nausea or vomiting chest pain shortness of breath, is complaining of some itching in the vaginal area as well as some whitish discharge she does have history of recurrent event vaginal candidiasis and usually use cream as well as medication  Gen -no fevers, chills  CV-no chest pain, palpitations  Resp-no cough, dyspnea  GI-no N/V/D, abd pain  Pain is controlled      OBJECTIVE        Vital Sign Min/Max for last 24 hours  Temp  Min: 97.8 °F (36.6  °C)  Max: 101.3 °F (38.5 °C)   BP  Min: 116/72  Max: 141/87   Pulse  Min: 104  Max: 120   Resp  Min: 17  Max: 18   SpO2  Min: 94 %  Max: 96 %   Flow (L/min)  Min: 2  Max: 2      Intake/Output Summary (Last 24 hours) at 02/15/17 0939  Last data filed at 02/14/17 1901   Gross per 24 hour   Intake    300 ml   Output   1200 ml   Net   -900 ml           Gen-no acute distress  CV-RRR, S1 S2 normal, no m/r/g  Resp-CTAB except for poor entry to the bases, no wheezes  Abd-soft, NT, ND, +BS  Ext-no edema; LUE in sling, surgical site under dressing  Neuro-A&Ox3, no focal deficits  Psych-appropriate mood  Skin, no new rashes over last 24 hours    Medications    Current Facility-Administered Medications:   •  albuterol (PROVENTIL) nebulizer solution 0.5% 2.5 mg/0.5mL, 2.5 mg, Nebulization, Q6H PRN, Gerson Carcamo MD  •  amitriptyline (ELAVIL) tablet 200 mg, 200 mg, Oral, Nightly, Carissa Arciniega MD, 200 mg at 02/14/17 2047  •  atorvastatin (LIPITOR) tablet 80 mg, 80 mg, Oral, Daily, Carissa Arciniega MD, 80 mg at 02/14/17 0816  •  bisacodyl (DULCOLAX) EC tablet 10 mg, 10 mg, Oral, Daily PRN, Gerson Carcamo MD  •  buPROPion (WELLBUTRIN) tablet 100 mg, 100 mg, Oral, Q8H, Carissa Arciniega MD, 100 mg at 02/15/17 0531  •  clonazePAM (KlonoPIN) tablet 2 mg, 2 mg, Oral, TID PRN, Carissa Arciniega MD, 2 mg at 02/14/17 2122  •  docusate sodium (COLACE) capsule 100 mg, 100 mg, Oral, BID PRN, Gerson Carcamo MD  •  escitalopram (LEXAPRO) tablet 10 mg, 10 mg, Oral, Daily, Carissa Arciniega MD, 10 mg at 02/14/17 0816  •  famotidine (PEPCID) tablet 20 mg, 20 mg, Oral, BID PRN, Carissa Arciniega MD  •  gabapentin (NEURONTIN) capsule 800 mg, 800 mg, Oral, Q8H, Carissa Arciniega MD, 800 mg at 02/15/17 0531  •  HYDROmorphone (DILAUDID) injection 0.5 mg, 0.5 mg, Intravenous, Q6H PRN **AND** naloxone (NARCAN) injection 0.1 mg, 0.1 mg, Intravenous, Q5 Min PRN, Gerson Carcamo MD  •  magnesium hydroxide (MILK OF MAGNESIA) suspension 2400 mg/10mL 10 mL, 10 mL, Oral,  Daily PRN, Gerson Carcamo MD  •  nicotine (NICODERM CQ) 14 MG/24HR patch 1 patch, 1 patch, Transdermal, Q24H, Carissa Arciniega MD, 1 patch at 02/14/17 2306  •  oxyCODONE-acetaminophen (PERCOCET)  MG per tablet 2 tablet, 2 tablet, Oral, Q4H PRN, Gerson Carcamo MD  •  potassium chloride (MICRO-K) CR capsule 40 mEq, 40 mEq, Oral, PRN, 40 mEq at 02/14/17 1852 **OR** potassium chloride (KLOR-CON) packet 40 mEq, 40 mEq, Oral, PRN **OR** potassium chloride 10 mEq in 100 mL IVPB, 10 mEq, Intravenous, Q1H PRN, AMANDEEP Martinez  •  ropivacaine (NAROPIN) 0.2% 550 mL On-Q C-block, 6 mL/hr, Peripheral Nerve, Continuous, Rainer Bojorquez CRNA, Last Rate: 6 mL/hr at 02/14/17 1841, 6 mL/hr at 02/14/17 1841  •  sennosides-docusate sodium (SENOKOT-S) 8.6-50 MG tablet 2 tablet, 2 tablet, Oral, BID, Gerson Carcamo MD, 2 tablet at 02/14/17 1852  •  sodium chloride 0.45 % infusion, 50 mL/hr, Intravenous, Continuous, Gerson Carcamo MD, Last Rate: 50 mL/hr at 02/13/17 1847, 50 mL/hr at 02/13/17 1847  •  ziprasidone (GEODON) capsule 80 mg, 80 mg, Oral, BID With Meals, Carissa Arciniega MD, 80 mg at 02/14/17 1852  I have reviewed the labs, culture data, radiology results, and diagnostic studies.    Results from last 7 days  Lab Units 02/15/17  0502 02/14/17  2343 02/14/17  0438 02/10/17  1018   SODIUM mmol/L 136  --  137 142   POTASSIUM mmol/L 4.0 3.9 3.5 4.5   CHLORIDE mmol/L 105  --  103 109   TOTAL CO2 mmol/L 29.0  --  28.0 30.0   BUN mg/dL 8*  --  16 16   CREATININE mg/dL 0.50*  --  0.50* 0.70   CALCIUM mg/dL 9.4  --  9.6 9.4   GLUCOSE mg/dL 110*  --  117* 92       Results from last 7 days  Lab Units 02/15/17  0502 02/14/17  0438 02/10/17  1018   WBC 10*3/mm3 11.40* 12.80* 10.64   HEMOGLOBIN g/dL 9.8* 10.0* 11.2*   HEMATOCRIT % 31.4* 31.0* 34.5   PLATELETS 10*3/mm3 368 397 355           Culture Data:    Radiology Results:  Imaging Results (last 24 hours)     Procedure Component Value Units Date/Time    XR Chest PA &  Lateral [[id number]]      Updated:  02/14/17 1443    XR Shoulder 2+ View Left [[id number]] Collected:  02/14/17 1212     Updated:  02/14/17 1522    Narrative:       EXAMINATION: XR SHOULDER 2+ VW LEFT-      INDICATION: Postop.      COMPARISON: None.     FINDINGS: A left reverse shoulder replacement has been performed.  Hardware is well positioned. The alignment is excellent and the native  bone structures are intact.           Impression:       1. Status post reverse left shoulder replacement.  2. There is mild fragmentation of the upper left humerus. Overall  alignment is excellent and the bone integrity is intact.     D:  02/14/2017  E:  02/14/2017     This report was finalized on 2/14/2017 3:20 PM by Dr. Emerson Low MD.           *. Please note that portions of this note were completed with a voice recognition program. Efforts were made to edit the dictations, but occasionally words are mistranscribed.  Darion Moraes MD02/15/179:39 AM

## 2017-02-15 NOTE — PROGRESS NOTES
Continued Stay Note  [facility]     Patient Name: Kayleigh Thompson  MRN: [id number]  Today's Date: 2/15/2017    Admit Date: 2/13/2017          Discharge Plan       02/15/17 1600    Case Management/Social Work Plan    Plan SNF    Additional Comments Referrals have been made to both [facility] and [facility].  Patient is on waiting list for sub-acute bed at [facility].  [facility]                Discharge Codes     None            Beatris Cabrera

## 2017-02-16 ENCOUNTER — APPOINTMENT (OUTPATIENT)
Dept: CT IMAGING | Facility: HOSPITAL | Age: 57
End: 2017-02-16
Attending: FAMILY MEDICINE

## 2017-02-16 LAB
ANION GAP SERPL CALCULATED.3IONS-SCNC: 3 MMOL/L (ref 3–11)
BASOPHILS # BLD AUTO: 0.02 10*3/MM3 (ref 0–0.2)
BASOPHILS NFR BLD AUTO: 0.2 % (ref 0–1)
BUN BLD-MCNC: 9 MG/DL (ref 9–23)
BUN/CREAT SERPL: 15 (ref 7–25)
CALCIUM SPEC-SCNC: 9.1 MG/DL (ref 8.7–10.4)
CHLORIDE SERPL-SCNC: 104 MMOL/L (ref 99–109)
CO2 SERPL-SCNC: 31 MMOL/L (ref 20–31)
CREAT BLD-MCNC: 0.6 MG/DL (ref 0.6–1.3)
DEPRECATED RDW RBC AUTO: 54.1 FL (ref 37–54)
EOSINOPHIL # BLD AUTO: 0.24 10*3/MM3 (ref 0.1–0.3)
EOSINOPHIL NFR BLD AUTO: 2.2 % (ref 0–3)
ERYTHROCYTE [DISTWIDTH] IN BLOOD BY AUTOMATED COUNT: 14.8 % (ref 11.3–14.5)
GFR SERPL CREATININE-BSD FRML MDRD: 103 ML/MIN/1.73
GLUCOSE BLD-MCNC: 110 MG/DL (ref 70–100)
HCT VFR BLD AUTO: 31.4 % (ref 34.5–44)
HGB BLD-MCNC: 9.7 G/DL (ref 11.5–15.5)
IMM GRANULOCYTES # BLD: 0.04 10*3/MM3 (ref 0–0.03)
IMM GRANULOCYTES NFR BLD: 0.4 % (ref 0–0.6)
LYMPHOCYTES # BLD AUTO: 2.66 10*3/MM3 (ref 0.6–4.8)
LYMPHOCYTES NFR BLD AUTO: 23.8 % (ref 24–44)
MAGNESIUM SERPL-MCNC: 2.2 MG/DL (ref 1.3–2.7)
MCH RBC QN AUTO: 30.6 PG (ref 27–31)
MCHC RBC AUTO-ENTMCNC: 30.9 G/DL (ref 32–36)
MCV RBC AUTO: 99.1 FL (ref 80–99)
MONOCYTES # BLD AUTO: 0.94 10*3/MM3 (ref 0–1)
MONOCYTES NFR BLD AUTO: 8.4 % (ref 0–12)
NEUTROPHILS # BLD AUTO: 7.26 10*3/MM3 (ref 1.5–8.3)
NEUTROPHILS NFR BLD AUTO: 65 % (ref 41–71)
PLATELET # BLD AUTO: 410 10*3/MM3 (ref 150–450)
PMV BLD AUTO: 8.4 FL (ref 6–12)
POTASSIUM BLD-SCNC: 3.8 MMOL/L (ref 3.5–5.5)
RBC # BLD AUTO: 3.17 10*6/MM3 (ref 3.89–5.14)
SODIUM BLD-SCNC: 138 MMOL/L (ref 132–146)
WBC NRBC COR # BLD: 11.16 10*3/MM3 (ref 3.5–10.8)

## 2017-02-16 PROCEDURE — 80048 BASIC METABOLIC PNL TOTAL CA: CPT | Performed by: FAMILY MEDICINE

## 2017-02-16 PROCEDURE — 97530 THERAPEUTIC ACTIVITIES: CPT | Performed by: OCCUPATIONAL THERAPIST

## 2017-02-16 PROCEDURE — 0 IOPAMIDOL PER 1 ML: Performed by: ORTHOPAEDIC SURGERY

## 2017-02-16 PROCEDURE — 83735 ASSAY OF MAGNESIUM: CPT | Performed by: FAMILY MEDICINE

## 2017-02-16 PROCEDURE — 99233 SBSQ HOSP IP/OBS HIGH 50: CPT | Performed by: FAMILY MEDICINE

## 2017-02-16 PROCEDURE — 85025 COMPLETE CBC W/AUTO DIFF WBC: CPT | Performed by: FAMILY MEDICINE

## 2017-02-16 PROCEDURE — 97116 GAIT TRAINING THERAPY: CPT

## 2017-02-16 PROCEDURE — 71275 CT ANGIOGRAPHY CHEST: CPT

## 2017-02-16 RX ORDER — SODIUM CHLORIDE 9 MG/ML
150 INJECTION, SOLUTION INTRAVENOUS CONTINUOUS
Status: ACTIVE | OUTPATIENT
Start: 2017-02-16 | End: 2017-02-16

## 2017-02-16 RX ORDER — OXYCODONE AND ACETAMINOPHEN 10; 325 MG/1; MG/1
2 TABLET ORAL EVERY 4 HOURS PRN
Qty: 50 TABLET | Refills: 0 | Status: SHIPPED | OUTPATIENT
Start: 2017-02-16 | End: 2017-02-26

## 2017-02-16 RX ADMIN — GABAPENTIN 800 MG: 400 CAPSULE ORAL at 05:03

## 2017-02-16 RX ADMIN — AMITRIPTYLINE HYDROCHLORIDE 200 MG: 50 TABLET, FILM COATED ORAL at 20:36

## 2017-02-16 RX ADMIN — DOCUSATE SODIUM AND SENNOSIDES 2 TABLET: 8.6; 5 TABLET, FILM COATED ORAL at 18:23

## 2017-02-16 RX ADMIN — OXYCODONE HYDROCHLORIDE AND ACETAMINOPHEN 2 TABLET: 10; 325 TABLET ORAL at 16:34

## 2017-02-16 RX ADMIN — BUPROPION HYDROCHLORIDE 100 MG: 100 TABLET, FILM COATED ORAL at 05:03

## 2017-02-16 RX ADMIN — BUPROPION HYDROCHLORIDE 100 MG: 100 TABLET, FILM COATED ORAL at 20:36

## 2017-02-16 RX ADMIN — ESCITALOPRAM OXALATE 10 MG: 10 TABLET ORAL at 08:40

## 2017-02-16 RX ADMIN — SODIUM CHLORIDE 150 ML/HR: 9 INJECTION, SOLUTION INTRAVENOUS at 14:15

## 2017-02-16 RX ADMIN — OXYCODONE HYDROCHLORIDE AND ACETAMINOPHEN 2 TABLET: 10; 325 TABLET ORAL at 05:25

## 2017-02-16 RX ADMIN — ATORVASTATIN CALCIUM 80 MG: 40 TABLET, FILM COATED ORAL at 08:40

## 2017-02-16 RX ADMIN — OXYCODONE HYDROCHLORIDE AND ACETAMINOPHEN 2 TABLET: 10; 325 TABLET ORAL at 10:27

## 2017-02-16 RX ADMIN — GABAPENTIN 800 MG: 400 CAPSULE ORAL at 20:35

## 2017-02-16 RX ADMIN — IOPAMIDOL 65 ML: 755 INJECTION, SOLUTION INTRAVENOUS at 15:35

## 2017-02-16 RX ADMIN — ZIPRASIDONE HYDROCHLORIDE 80 MG: 20 CAPSULE ORAL at 08:40

## 2017-02-16 RX ADMIN — MICONAZOLE NITRATE 1 APPLICATOR: 20 CREAM VAGINAL at 20:36

## 2017-02-16 RX ADMIN — OXYCODONE HYDROCHLORIDE AND ACETAMINOPHEN 2 TABLET: 10; 325 TABLET ORAL at 21:16

## 2017-02-16 RX ADMIN — ZIPRASIDONE HYDROCHLORIDE 80 MG: 20 CAPSULE ORAL at 18:23

## 2017-02-16 RX ADMIN — BUPROPION HYDROCHLORIDE 100 MG: 100 TABLET, FILM COATED ORAL at 16:34

## 2017-02-16 RX ADMIN — DOCUSATE SODIUM AND SENNOSIDES 2 TABLET: 8.6; 5 TABLET, FILM COATED ORAL at 08:40

## 2017-02-16 NOTE — PLAN OF CARE
Problem: Patient Care Overview (Adult)  Goal: Plan of Care Review  Outcome: Ongoing (interventions implemented as appropriate)    02/16/17 1116   Coping/Psychosocial Response Interventions   Plan Of Care Reviewed With patient   Outcome Evaluation   Outcome Summary/Follow up Plan pt still unsteady gait,some shoulder pain,increased distance   Patient Care Overview   Progress improving         Problem: Inpatient Physical Therapy  Goal: Bed Mobility Goal LTG- PT  Outcome: Outcome(s) achieved Date Met:  02/16/17 02/14/17 1504 02/16/17 1116   Bed Mobility PT LTG   Bed Mobility PT LTG, Date Established 02/14/17 --    Bed Mobility PT LTG, Time to Achieve 5 days --    Bed Mobility PT LTG, Activity Type all bed mobility --    Bed Mobility PT LTG, [place] Level conditional independence --    Bed Mobility PT LTG, Outcome --  goal met       Goal: Transfer Training Goal 1 LTG- PT  Outcome: Outcome(s) achieved Date Met:  02/16/17 02/14/17 1504 02/16/17 1116   Transfer Training PT LTG   Transfer Training PT LTG, Date Established 02/14/17 --    Transfer Training PT LTG, Time to Achieve 5 days --    Transfer Training PT LTG, Activity Type all transfers --    Transfer Training PT LTG, [place] Level conditional independence --    Transfer Training PT LTG, Outcome --  goal met       Goal: Gait Training Goal LTG- PT  Outcome: Ongoing (interventions implemented as appropriate)    02/16/17 1116   Gait Training PT LTG   Gait Training Goal PT LTG, Outcome goal ongoing       Goal: Stair Training Goal LTG- PT  Outcome: Ongoing (interventions implemented as appropriate)    02/14/17 1504 02/16/17 1116   Stair Training PT LTG   Stair Training Goal PT LTG, Date Established 02/14/17 --    Stair Training Goal PT LTG, Time to Achieve 5 days --    Stair Training Goal PT LTG, Number of Steps 10 --    Stair Training Goal PT LTG, [place] Level conditional independence --    Stair Training Goal PT LTG, Assist Device 1 handrail --     Stair Training Goal PT LTG, Outcome --  goal ongoing

## 2017-02-16 NOTE — PLAN OF CARE
Problem: Patient Care Overview (Adult)  Goal: Plan of Care Review  Outcome: Ongoing (interventions implemented as appropriate)    02/16/17 0316   Coping/Psychosocial Response Interventions   Plan Of Care Reviewed With patient   Outcome Evaluation   Outcome Summary/Follow up Plan Pt VSS. Pain well controlled with prn pain medications. Pt denies numbness/tingling, On Q at 6. Dressing C,D,I. Pt rested through the night.   Patient Care Overview   Progress progress toward functional goals as expected         Problem: Perioperative Period (Adult)  Goal: Signs and Symptoms of Listed Potential Problems Will be Absent or Manageable (Perioperative Period)  Outcome: Ongoing (interventions implemented as appropriate)    Problem: Fall Risk (Adult)  Goal: Identify Related Risk Factors and Signs and Symptoms  Outcome: Ongoing (interventions implemented as appropriate)  Goal: Absence of Falls  Outcome: Ongoing (interventions implemented as appropriate)

## 2017-02-16 NOTE — PROGRESS NOTES
Acute Care - Physical Therapy Treatment Note  Psychiatric     Patient Name: Kayleigh Thompson  : 1960  MRN: [id number]  Today's Date: 2017  Onset of Illness/Injury or Date of Surgery Date: 17  Date of Referral to PT: (P) 17  Referring Physician: Dr. Carcamo    Admit Date: 2017    Visit Dx:    ICD-10-CM ICD-9-CM   1. Impaired functional mobility, balance, gait, and endurance Z74.09 V49.89   2. Impaired mobility and ADLs Z74.09 799.89   3. S/p reverse total shoulder arthroplasty, left Z96.612 V43.61     Patient Active Problem List   Diagnosis   • Closed 4-part fracture of proximal end of left humerus   • Tenosynovitis of left upper arm   • Sleep apnea   • GERD (gastroesophageal reflux disease)   • Bipolar disorder   • Arthritis   • Tobacco abuse   • Fever   • S/p left reverse total shoulder arthroplasty               Adult Rehabilitation Note       17 1010 02/15/17 1130       Rehab Assessment/Intervention    Discipline physical therapy assistant  -UD occupational therapist  -SK     Document Type therapy note (daily note)  -UD therapy note (daily note)  -SK     Subjective Information agree to therapy;complains of;pain  -UD no complaints;agree to therapy  -SK     Patient Effort, Rehab Treatment good  -UD good  -SK     Symptoms Noted During/After Treatment increased pain  -UD increased pain  -SK     Symptoms Noted Comment  pt was pre-medicated  -SK     Precautions/Limitations  non-weight bearing status   interscalene nerve cath L UE; ROM per MD protocol  -SK     Recorded by [UD] Katy Churchill PTA [SK] RACHEL Hussein     Vital Signs    Pre Patient Position Supine  -UD      Intra Patient Position Standing  -UD      Post Patient Position Supine  -UD      Recorded by [UD] Katy Churchill PTA      Pain Assessment    Pain Assessment  0-10  -SK     Pain Score  3  -SK     Post Pain Score  6  -SK     Pain Type  Acute pain;Surgical pain  -SK     Pain Location  Shoulder  -SK     Pain  Orientation  Left  -SK     Pain Intervention(s)  --   pre-medicated; nerve cath  -SK     Response to Interventions  tolerated  -SK     Recorded by  [SK] RACHEL Hussein     Pain 2    Pain Score 2 3  -UD      Pre Tx Pain Score 2 4  -UD      Post Tx Pain Score 2 4  -UD      Pain Type 2 Acute pain  -UD      Pain Location 2 Shoulder  -UD      Pain Orientation 2 Left  -UD      Pain Intervention(s) 2 Repositioned  -UD      Recorded by [UD] Katy Churchill PTA      Cognitive Assessment/Intervention    Current Cognitive/Communication Assessment  functional  -SK     Orientation Status  oriented x 4  -SK     Follows Commands/Answers Questions  100% of the time  -SK     Personal Safety  WNL/WFL  -SK     Personal Safety Interventions  fall prevention program maintained  -SK     Recorded by  [SK] RACHEL Hussein     Mobility Assessment/Training    Extremity Weight-Bearing Status  left upper extremity  -SK     Left Upper Extremity Weight-Bearing  non weight-bearing  -SK     Recorded by  [SK] Meliza De La Cruz OTR     Bed Mobility, Assessment/Treatment    Bed Mob, Supine to Sit, [place] independent  -UD      Bed Mob, Sit to Supine, [place] independent  -UD      Recorded by [UD] Katy Churchill PTA      Transfer Assessment/Treatment    Transfers, Sit-Stand [place] stand by assist  -UD      Transfers, Stand-Sit [place] stand by assist  -UD      Transfers, Sit-Stand-Sit, Assist Device straight cane  -UD      Recorded by [UD] Katy Churchill PTA      Gait Assessment/Treatment    Gait, [place] Level contact guard assist  -UD      Gait, Assistive Device straight cane  -UD      Gait, Distance (Feet) 400  -UD      Gait, Gait Deviations ataxia  -UD      Gait, Safety Issues balance decreased during turns  -UD      Gait, Impairments strength decreased  -UD      Recorded by [UD] Katy Churchill PTA      Lower Body Bathing Assessment/Training    LB Bathing Assess/Train, Comment  OT completed education on axillary  care   -SK     Recorded by  [SK] Meliza De La Cruz OTR     Upper Body Dressing Assessment/Training    UB Dressing Assess/Train, Clothing Type  doffing:;donning:   sling  -SK     UB Dressing Assess/Train, Assist Device  alley technique  -SK     UB Dressing Assess/Train, Position  supine  -SK     UB Dressing Assess/Train, [place]  maximum assist (25% patient effort)  -SK     UB Dressing Assess/Train, Impairments  ROM decreased;pain  -SK     UB Dressing Assess/Train, Comment  reinforced education of donning/doffing sling and no AROM shoulder for assistance  -SK     Recorded by  [SK] Meliza De La Cruz, SALAZARR     Therapy Exercises    Left Upper Extremity  PROM:;AAROM:;AROM:;10 reps;supine;elbow flexion/extension;hand pumps;pronation/supination;shoulder ER/IR;other reps   AROM digit, wrist, elbow F/E, pronation/supination...  -SK     LUE Other Reps  --   AROM scap retractions, PROM IR to chest, ER to 30....  -SK     LUE Resistance  --   FE to ~140 d/t pain tolerance  -SK     Bilateral Upper Extremity  --   AAROM req. on elbow f/e to iniaite then progressed to arom  -SK     Recorded by  [SK] Meliza De La Cruz OTR     Positioning and Restraints    Pre-Treatment Position in bed  -UD in bed  -SK     Post Treatment Position bed  -UD bed  -SK     In Bed notified nsg;supine;call light within reach;side rails up x2  -UD supine;call light within reach;encouraged to call for assist;with nsg   L sling in place, pillow for support, ice to incision site  -SK     Recorded by [UD] Katy Churchill PTA [SK] Meliza De La Cruz OTR       User Key  (r) = Recorded By, (t) = Taken By, (c) = Cosigned By    Initials Name Effective Dates    UD Katy Churchill PTA 06/22/15 -     SK Meliza De La Cruz OTR 05/18/15 -                 IP PT Goals       02/16/17 1116 02/14/17 1504       Bed Mobility PT LTG    Bed Mobility PT LTG, Date Established  02/14/17  -SC (r) LB (t) SC (c)     Bed Mobility PT LTG, Time to Achieve  5 days  -SC (r) LB (t) SC (c)     Bed  Mobility PT LTG, Activity Type  all bed mobility  -SC (r) LB (t) SC (c)     Bed Mobility PT LTG, [place] Level  conditional independence  -SC (r) LB (t) SC (c)     Bed Mobility PT LTG, Outcome goal met  -UD      Transfer Training PT LTG    Transfer Training PT LTG, Date Established  02/14/17  -SC (r) LB (t) SC (c)     Transfer Training PT LTG, Time to Achieve  5 days  -SC (r) LB (t) SC (c)     Transfer Training PT LTG, Activity Type  all transfers  -SC (r) LB (t) SC (c)     Transfer Training PT LTG, [place] Level  conditional independence  -SC (r) LB (t) SC (c)     Transfer Training PT LTG, Outcome goal met  -UD      Gait Training PT LTG    Gait Training Goal PT LTG, Date Established  02/14/17  -SC (r) LB (t) SC (c)     Gait Training Goal PT LTG, Time to Achieve  5 days  -SC (r) LB (t) SC (c)     Gait Training Goal PT LTG, [place] Level  supervision required  -SC (r) LB (t) SC (c)     Gait Training Goal PT LTG, Assist Device  cane, straight  -SC (r) LB (t) SC (c)     Gait Training Goal PT LTG, Distance to Achieve  500 ft.  -SC (r) LB (t) SC (c)     Gait Training Goal PT LTG, Outcome goal ongoing  -UD      Stair Training PT LTG    Stair Training Goal PT LTG, Date Established  02/14/17  -SC (r) LB (t) SC (c)     Stair Training Goal PT LTG, Time to Achieve  5 days  -SC (r) LB (t) SC (c)     Stair Training Goal PT LTG, Number of Steps  10  -SC (r) LB (t) SC (c)     Stair Training Goal PT LTG, [place] Level  conditional independence  -SC (r) LB (t) SC (c)     Stair Training Goal PT LTG, Assist Device  1 handrail  -SC (r) LB (t) SC (c)     Stair Training Goal PT LTG, Outcome goal ongoing  -UD        User Key  (r) = Recorded By, (t) = Taken By, (c) = Cosigned By    Initials Name Provider Type    ASHLI Costa, PT Physical Therapist    UD Katy Zhao, PTA Physical Therapy Assistant    KAYLAH Barbosa, PT Student PT Student          Physical Therapy Education     Title: PT OT SLP Therapies (Done)      Topic: Physical Therapy (Done)     Point: Mobility training (Done)    Learning Progress Summary    Learner Readiness Method Response Comment Documented by Status   Patient AMIE Moseley ZULEIKAGONZALES,NR   02/16/17 1116 Done    Acceptance E NR   02/14/17 1511 Active               Point: Home exercise program (Done)    Learning Progress Summary    Learner Readiness Method Response Comment Documented by Status   Patient AMIE Moseley ZULEIKAGONZALES,NR   02/16/17 1116 Done               Point: Body mechanics (Done)    Learning Progress Summary    Learner Readiness Method Response Comment Documented by Status   Patient AMIE Moseley GONZALES TO,NR   02/16/17 1116 Done    Acceptance E NR  LB 02/14/17 1511 Active               Point: Precautions (Done)    Learning Progress Summary    Learner Readiness Method Response Comment Documented by Status   Patient AMIE Moseley GONZALES TO,NR   02/16/17 1116 Done    Acceptance E NR   02/14/17 1511 Active                      User Key     Initials Effective Dates Name Provider Type Discipline     06/22/15 -  Katy Churchill, PTA Physical Therapy Assistant PT     03/23/16 -  Berkley Barbosa, PT Student PT Student PT                    PT Recommendation and Plan  Anticipated Equipment Needs At Discharge: (P) standard cane, bathing equipment, reacher (provided pt. with straight cane on 2/14/17)  Anticipated Discharge Disposition: (P) inpatient rehabilitation facility  Planned Therapy Interventions: bed mobility training, gait training, stair training, transfer training, strengthening, patient/family education  PT Frequency: (P) daily  Plan of Care Review  Plan Of Care Reviewed With: patient  Progress: improving  Outcome Summary/Follow up Plan: pt still unsteady gait,some shoulder pain,increased distance          Outcome Measures       02/16/17 1010 02/15/17 1130 02/14/17 1528    How much help from another person do you currently need...    Turning from your back to your side while in flat bed without using bedrails?  4  -UD      Moving from lying on back to sitting on the side of a flat bed without bedrails? 4  -UD      Moving to and from a bed to a chair (including a wheelchair)? 3  -UD      Standing up from a chair using your arms (e.g., wheelchair, bedside chair)? 3  -UD      Climbing 3-5 steps with a railing? 2  -UD      To walk in hospital room? 3  -UD      AM-PAC 6 Clicks Score 19  -UD      How much help from another is currently needed...    Putting on and taking off regular lower body clothing?  1  -SK 1  -AR    Bathing (including washing, rinsing, and drying)  2  -SK 2  -AR    Toileting (which includes using toilet bed pan or urinal)  3  -SK 3  -AR    Putting on and taking off regular upper body clothing  2  -SK 2  -AR    Taking care of personal grooming (such as brushing teeth)  3  -SK 3  -AR    Eating meals  3  -SK 3  -AR    Score  14  -SK 14  -AR    Functional Assessment    Outcome Measure Options   AM-PAC 6 Clicks Daily Activity (OT)  -AR      02/14/17 1330          How much help from another person do you currently need...    Turning from your back to your side while in flat bed without using bedrails? 3  -SC (r) LB (t) SC (c)      Moving from lying on back to sitting on the side of a flat bed without bedrails? 3  -SC (r) LB (t) SC (c)      Moving to and from a bed to a chair (including a wheelchair)? 4  -SC (r) LB (t) SC (c)      Standing up from a chair using your arms (e.g., wheelchair, bedside chair)? 4  -SC (r) LB (t) SC (c)      Climbing 3-5 steps with a railing? 3  -SC (r) LB (t) SC (c)      To walk in hospital room? 3  -SC (r) LB (t) SC (c)      AM-PAC 6 Clicks Score 20  -SC (r) LB (t)      Functional Assessment    Outcome Measure Options AM-PAC 6 Clicks Basic Mobility (PT)  -SC (r) LB (t) SC (c)        User Key  (r) = Recorded By, (t) = Taken By, (c) = Cosigned By    Initials Name Provider Type    ASHLI Costa, PT Physical Therapist    UD Katy Zhao, PTA Physical Therapy Assistant    MERARY RAMOS  Jono, OTR Occupational Therapist    BEBETO Agrawal, OT Occupational Therapist    KAYLAH Barbosa, PT Student PT Student           Time Calculation:         PT Charges       02/16/17 1118          Time Calculation    PT Received On 02/16/17  -      PT Goal Re-Cert Due Date 02/24/17  -      Time Calculation- PT    Total Timed Code Minutes- PT 15 minute(s)  -UD        User Key  (r) = Recorded By, (t) = Taken By, (c) = Cosigned By    Initials Name Provider Type    UD Katy Churchill PTA Physical Therapy Assistant          Therapy Charges for Today     Code Description Service Date Service Provider Modifiers Qty    [id number] HC GAIT TRAINING EA 15 MIN 2/16/2017 Katy Churchill PTA GP 1          PT G-Codes  Outcome Measure Options: AM-PAC 6 Clicks Daily Activity (OT)    Katy Churchill PTA  2/16/2017

## 2017-02-16 NOTE — PLAN OF CARE
Problem: Patient Care Overview (Adult)  Goal: Plan of Care Review  Outcome: Ongoing (interventions implemented as appropriate)    02/16/17 1710   Coping/Psychosocial Response Interventions   Plan Of Care Reviewed With patient   Patient Care Overview   Progress improving         Problem: Perioperative Period (Adult)  Goal: Signs and Symptoms of Listed Potential Problems Will be Absent or Manageable (Perioperative Period)  Outcome: Ongoing (interventions implemented as appropriate)    02/16/17 1710   Perioperative Period   Problems Assessed (Perioperative Period) all   Problems Present (Perioperative Period) pain;hypoxia/hypoxemia         Problem: Fall Risk (Adult)  Goal: Identify Related Risk Factors and Signs and Symptoms  Outcome: Ongoing (interventions implemented as appropriate)    02/16/17 1710   Fall Risk   Fall Risk: Related Risk Factors polypharmacy;gait/mobility problems   Fall Risk: Signs and Symptoms presence of risk factors         Problem: Mobility, Physical Impaired (Adult)  Goal: Identify Related Risk Factors and Signs and Symptoms  Outcome: Ongoing (interventions implemented as appropriate)    02/16/17 1710   Mobility, Physical Impaired   Physical Mobility, Impaired: Related Risk Factors fatigue;pain/discomfort;psychosocial factor   Signs and Symptoms (Physical Mobility Impaired) decreased balance

## 2017-02-16 NOTE — PROGRESS NOTES
[facility] Medicine Services    ASSESSMENT / PLAN               55 yo F with hx of GERD, bipolar disorder, and sleep apnea presents for scheduled left reverse total shoulder arthroplasty.   S/p left reverse total shoulder arthroplasty, postoperative day 2, pain control per anesthesia, discharge planning  HYpoxia overnight, history of FRANCIS, get CPAP in night, need sleep study as outpatient. Does not use cpap at home. day time pulse ox 89-92 % probably sec to atelectasis but due to transient fever, post op poor mobility will get cta to r/o pe, discussed with patient, use Incentive spirometery     Fever, transient, brief 72 hrs ago   , urinalysis and chest x-ray with no acute findings, monitor, afebrile now mild leukocytosis yesterday but probably reactive better today, monitor for any further fever episodes,    Candida vulvovaginitis  ,  Post dose of  Diflucan with vaginal cream  Closed 4-part fracture of proximal end of left humerus, as above  Tenosynovitis of left upper arm  Sleep apnea  GERD (gastroesophageal reflux disease), on famotidine  Bipolar disorder, continue home meds  Arthritis  Tobacco abuse, nicotine patch  Counseled   Encouraged incentive spirometry.  --Continue PT/OT, pain control. Dr. Carcamo following.   --CM making referral for [facility].    --Highly complex set of issues with high risk for further serious morbidity and other serious sequela, Time spent > 40  minutes with > 50% time spent in the room face to face with patient and relatives       Expected Discharge disposition in      1-2      Days to rehab     SUBJECTIVE--HPI/ Events overnight / CC- Hospital Follow up visit/ ROS-not detailed ,  as performed below    Denies any nausea or vomiting chest pain, some mikld shortness of breath, i Gen -no fevers, chills  CV-no chest pain, palpitations  Resp-no cough, dyspnea  GI-no N/V/D, abd pain  Pain is controlled      OBJECTIVE        Vital Sign Min/Max for last 24 hours  Temp  Min:  96.8 °F (36 °C)  Max: 99.2 °F (37.3 °C)   BP  Min: 93/61  Max: 148/81   Pulse  Min: 97  Max: 108   Resp  Min: 16  Max: 18   SpO2  Min: 86 %  Max: 95 %   Flow (L/min)  Min: 2  Max: 2      Intake/Output Summary (Last 24 hours) at 02/16/17 1642  Last data filed at 02/16/17 1300   Gross per 24 hour   Intake    325 ml   Output    250 ml   Net     75 ml           Gen-no acute distress  CV-RRR, S1 S2 normal, no m/r/g  Resp-CTAB except for poor entry to the bases with left basilar crackles. , no wheezes  Abd-soft, NT, ND, +BS  Ext-no edema; LUE in sling, surgical site under dressing  Neuro-A&Ox3, no focal deficits  Psych-appropriate mood  Skin, no new rashes over last 24 hours    Medications    Current Facility-Administered Medications:   •  albuterol (PROVENTIL) nebulizer solution 0.5% 2.5 mg/0.5mL, 2.5 mg, Nebulization, Q6H PRN, Gerson Carcamo MD  •  amitriptyline (ELAVIL) tablet 200 mg, 200 mg, Oral, Nightly, Carissa Arciniega MD, 200 mg at 02/15/17 2140  •  atorvastatin (LIPITOR) tablet 80 mg, 80 mg, Oral, Daily, Carissa Arciniega MD, 80 mg at 02/16/17 0840  •  bisacodyl (DULCOLAX) EC tablet 10 mg, 10 mg, Oral, Daily PRN, Gerson Carcamo MD  •  buPROPion (WELLBUTRIN) tablet 100 mg, 100 mg, Oral, Q8H, Carissa Arciniega MD, 100 mg at 02/16/17 1634  •  clonazePAM (KlonoPIN) tablet 2 mg, 2 mg, Oral, TID PRN, Carissa Arciniega MD, 2 mg at 02/15/17 2359  •  docusate sodium (COLACE) capsule 100 mg, 100 mg, Oral, BID PRN, Gerson Carcamo MD  •  escitalopram (LEXAPRO) tablet 10 mg, 10 mg, Oral, Daily, Carissa Arciniega MD, 10 mg at 02/16/17 0840  •  famotidine (PEPCID) tablet 20 mg, 20 mg, Oral, BID PRN, Carissa Arciniega MD, 20 mg at 02/15/17 1513  •  gabapentin (NEURONTIN) capsule 800 mg, 800 mg, Oral, Q8H, Carissa Arciniega MD, 800 mg at 02/16/17 0503  •  HYDROmorphone (DILAUDID) injection 0.5 mg, 0.5 mg, Intravenous, Q6H PRN, 0.5 mg at 02/15/17 2141 **AND** naloxone (NARCAN) injection 0.1 mg, 0.1 mg, Intravenous, Q5 Min PRN, Gerson Carcamo,  MD  •  HYDROmorphone (DILAUDID) injection 0.5 mg, 0.5 mg, Intravenous, Q6H PRN, Gerson Carcamo MD  •  magnesium hydroxide (MILK OF MAGNESIA) suspension 2400 mg/10mL 10 mL, 10 mL, Oral, Daily PRN, Gerson Carcamo MD  •  miconazole (MICOTIN) vaginal cream 1 applicator, 1 applicator, Vaginal, Nightly, Darion Moraes MD, 1 applicator at 02/15/17 2226  •  nicotine (NICODERM CQ) 14 MG/24HR patch 1 patch, 1 patch, Transdermal, Q24H, Carissa Arciniega MD, 1 patch at 02/15/17 2141  •  oxyCODONE-acetaminophen (PERCOCET)  MG per tablet 2 tablet, 2 tablet, Oral, Q4H PRN, Gerson Carcamo MD, 2 tablet at 02/16/17 1634  •  potassium chloride (MICRO-K) CR capsule 40 mEq, 40 mEq, Oral, PRN, 40 mEq at 02/14/17 1852 **OR** potassium chloride (KLOR-CON) packet 40 mEq, 40 mEq, Oral, PRN **OR** potassium chloride 10 mEq in 100 mL IVPB, 10 mEq, Intravenous, Q1H PRN, AMANDEEP Martinez  •  ropivacaine (NAROPIN) 0.2% 550 mL On-Q C-block, 6 mL/hr, Peripheral Nerve, Continuous, Rainer Bojorquez CRNA, Last Rate: 6 mL/hr at 02/14/17 1841, 6 mL/hr at 02/14/17 1841  •  sennosides-docusate sodium (SENOKOT-S) 8.6-50 MG tablet 2 tablet, 2 tablet, Oral, BID, Gerson Carcamo MD, 2 tablet at 02/16/17 0840  •  sodium chloride 0.9 % infusion, 150 mL/hr, Intravenous, Continuous, Darion Moraes MD, Last Rate: 150 mL/hr at 02/16/17 1415, 150 mL/hr at 02/16/17 1415  •  ziprasidone (GEODON) capsule 80 mg, 80 mg, Oral, BID With Meals, Carissa Arciniega MD, 80 mg at 02/16/17 0840  I have reviewed the labs, culture data, radiology results, and diagnostic studies.    Results from last 7 days  Lab Units 02/16/17  0457 02/15/17  0502 02/14/17  2343 02/14/17  0438   SODIUM mmol/L 138 136  --  137   POTASSIUM mmol/L 3.8 4.0 3.9 3.5   CHLORIDE mmol/L 104 105  --  103   TOTAL CO2 mmol/L 31.0 29.0  --  28.0   BUN mg/dL 9 8*  --  16   CREATININE mg/dL 0.60 0.50*  --  0.50*   CALCIUM mg/dL 9.1 9.4  --  9.6   GLUCOSE mg/dL 110* 110*  --  117*       Results  from last 7 days  Lab Units 02/16/17  0457 02/15/17  0502 02/14/17  0438   WBC 10*3/mm3 11.16* 11.40* 12.80*   HEMOGLOBIN g/dL 9.7* 9.8* 10.0*   HEMATOCRIT % 31.4* 31.4* 31.0*   PLATELETS 10*3/mm3 410 368 397           Culture Data:    Radiology Results:  Imaging Results (last 24 hours)     Procedure Component Value Units Date/Time    XR Chest PA & Lateral [[id number]]      Updated:  02/14/17 1443    XR Shoulder 2+ View Left [[id number]] Collected:  02/14/17 1212     Updated:  02/14/17 1522    Narrative:       EXAMINATION: XR SHOULDER 2+ VW LEFT-      INDICATION: Postop.      COMPARISON: None.     FINDINGS: A left reverse shoulder replacement has been performed.  Hardware is well positioned. The alignment is excellent and the native  bone structures are intact.           Impression:       1. Status post reverse left shoulder replacement.  2. There is mild fragmentation of the upper left humerus. Overall  alignment is excellent and the bone integrity is intact.     D:  02/14/2017  E:  02/14/2017     This report was finalized on 2/14/2017 3:20 PM by Dr. Emerson Low MD.           *. Please note that portions of this note were completed with a voice recognition program. Efforts were made to edit the dictations, but occasionally words are mistranscribed.  Darion Moraes MD02/16/174:42 PM

## 2017-02-16 NOTE — PROGRESS NOTES
Orthopedic Daily Progress Note      CC: s/p Left shoulder surgery    Pain controlled. Patient sleeping soundly on examination today.   General: no fevers, chills  Abdomen: No nausea, vomiting, or diarrhea    No other complaints    Physical Exam:  I have reviewed the vital signs.  Temp:  [96.8 °F (36 °C)-99.2 °F (37.3 °C)] 97.8 °F (36.6 °C)  Heart Rate:  [] 100  Resp:  [16-18] 16  BP: (114-148)/(64-81) 138/81    Objective  General Appearance:    Alert, cooperative, no distress  Extremities: No clubbing, cyanosis, or edema to lower extremities  Pulses:  2+ in distal surgical extremity  Skin: Dressing Clean/dry/intact      Results Review:    I have reviewed the labs, radiology results and diagnostic studies:Yes      Results from last 7 days  Lab Units 02/16/17  0457   WBC 10*3/mm3 11.16*   HEMOGLOBIN g/dL 9.7*   PLATELETS 10*3/mm3 410       Results from last 7 days  Lab Units 02/16/17  0457   SODIUM mmol/L 138   POTASSIUM mmol/L 3.8   TOTAL CO2 mmol/L 31.0   CREATININE mg/dL 0.60   GLUCOSE mg/dL 110*       I have reviewed the medications.    Assessment/Problem List  POD# 3 S/p Left reverse TSA for proximal humerus fx    Plan  Continue PT exercises  Pain control  D/c planning- Patient referred to subacute and will be d/c'd when bed available  Appreciate medicine consult recs      Discharge Planning: I expect patient to be discharged to subacute in 1-2 days pending bed availability.    Alfredo Carr MD  02/16/17  7:56 AM

## 2017-02-16 NOTE — PROGRESS NOTES
[facility]    Acute pain service Inpatient Progress Note    Patient Name: Kayleigh Thompson  :  1960  MRN:  [id number]        Acute Pain  Service Inpatient Progress Note:    Analgesia:Excellent  Pain Score:1/10  LOC: alert and awake  Side Effects:None  Catheter Site:clean  Cath type: peripheral nerve cath with ON Q  Infusion rate: 6ml/hr  Catheter Plan:Catheter to remain Insitu, Continue catheter infusion rate unchanged and Patient to be discharged home

## 2017-02-16 NOTE — PLAN OF CARE
Problem: Patient Care Overview (Adult)  Goal: Plan of Care Review  Outcome: Ongoing (interventions implemented as appropriate)    02/16/17 1727   Coping/Psychosocial Response Interventions   Plan Of Care Reviewed With patient   Outcome Evaluation   Outcome Summary/Follow up Plan Pt now anticipates DC home with assist from family, recommend HHOT/PT to assist. No family present for teaching. Educated pt on donning/doffing sling, she requires max assist yet was able to verbalize to a caregiver proper application with min cues. Educated pt on SROM while completing LUE HEP. OT achieved PROM , IR chest and ER 20; pt achieved FE 90, IR chest and ER 10. C/o pain 6 at start and 3/10 at end of session. Should rehab bed become available, recommend IP rehab in preparation for safe DC home given current amout of assistance pt needs with sling management, h/o falls and current assist level needed with ADL activity.    Patient Care Overview   Progress improving         Problem: Inpatient Occupational Therapy  Goal: Transfer Training Goal 1 LTG- OT  Outcome: Outcome(s) achieved Date Met:  02/16/17 02/14/17 1712 02/16/17 1727   Transfer Training OT LTG   Transfer Training OT LTG, Date Established 02/14/17 --    Transfer Training OT LTG, Time to Achieve 1 wk --    Transfer Training OT LTG, Activity Type sit to stand/stand to sit;toilet --    Transfer Training OT LTG, [place] Level supervision required;verbal cues required --    Transfer Training OT LTG, Assist Device cane, straight --    Transfer Training OT LTG, Outcome --  goal met       Goal: Range of Motion Goal LTG- OT  Outcome: Ongoing (interventions implemented as appropriate)    02/14/17 1712 02/16/17 1727   Range of Motion OT LTG   Range of Motion Goal OT LTG, Date Established 02/14/17 --    Range of Motion Goal OT LTG, Time to Achieve 1 wk --    Range of Motion Goal OT LTG, AROM Measure Pt will particiapte in LUE HEP within physician parameters daily to  support ADL function  --    Range of Motion Goal OT LTG, Outcome --  goal ongoing       Goal: Patient Education Goal LTG- OT  Outcome: Ongoing (interventions implemented as appropriate)    02/14/17 1712 02/16/17 1727   Patient Education OT LTG   Patient Education OT LTG, Date Established 02/14/17 --    Patient Education OT LTG, Time to Achieve 1 wk --    Patient Education OT LTG, Education Type written program;HEP;precautions per surgeon;1 hand/alley technique --    Patient Education OT LTG, Education Understanding demonstrates adequately;verbalizes understanding --    Patient Education OT LTG Outcome --  goal ongoing       Goal: UB Dressing Goal LTG- OT  Outcome: Ongoing (interventions implemented as appropriate)    02/14/17 1712 02/16/17 1727   UB Dressing OT LTG   UB Dressing Goal OT LTG, Date Established 02/14/17 --    UB Dressing Goal OT LTG, Time to Achieve 1 wk --    UB Dressing Goal OT LTG, Activity Type Pt will don/doff LUE sling --    UB Dressing Goal OT LTG, [place] Level verbal cues required;moderate assist (50% patient effort) --    UB Dressing Goal OT LTG, Outcome --  goal ongoing

## 2017-02-16 NOTE — PROGRESS NOTES
Continued Stay Note  [facility]     Patient Name: Kayleigh Thompson  MRN: [id number]  Today's Date: 2/16/2017    Admit Date: 2/13/2017          Discharge Plan       02/16/17 1035    Case Management/Social Work Plan    Plan Home with [facility] [place]    Patient/Family In Agreement With Plan yes    Additional Comments Received call from [place] at [facility] saying that they do not have any rehab beds available for Ms. Thompson.   [facility] also does not have any beds.  Ms. Thompson will likely not be approved for rehab from [place].   Met with Ms. Thompson at the bedside and explained situation and she was agreeable to going home with [facility] [place].  Left message with Dr. Carcamo's office regarding home health orders.  Referred Ms. Thompson to Johann with [facility].  CM will continue to follow.              Discharge Codes     None        Expected Discharge Date and Time     Expected Discharge Date Expected Discharge Time    Feb 16, 2017             Daysi Rawls

## 2017-02-16 NOTE — PAYOR COMM NOTE
"Kayleigh Thompson (56 y.o. Female)     Date of Birth Social Security Number Address Home Phone MRN    1960  [address]  [address]  [facility] [address] [phone] [id number]    Hinduism Marital Status          Non-Anglican Single       Admission Date Admission Type Admitting Provider Attending Provider Department, Room/Bed    2/13/17 Elective Gerson Carcamo MD Sajadi, Kaveh Robert, MD [facility] 5E, S505/1    Discharge Date Discharge Disposition Discharge Destination         Rehab Facility or Unit (DC - External)             Attending Provider: Gerson Carcamo MD     Allergies:  Ceclor [Cefaclor]    Isolation:  None   Infection:  None   Code Status:  FULL    Ht:  66\" (167.6 cm)   Wt:  160 lb (72.6 kg)    Admission Cmt:  None   Principal Problem:  S/p left reverse total shoulder arthroplasty [Z96.619]                 Active Insurance as of 2/13/2017     Primary Coverage     Payor Plan Insurance Group Employer/Plan Group    [employer] EMPLOYEE [id number]     Payor Plan Address Payor Plan Phone Number Effective From Effective To    [address] [phone] 1/1/2015     [place] [address]       Subscriber Name Subscriber Birth Date Member ID       KAYLEIGH THOMPSON 1960 [id number]                 Emergency Contacts      (Rel.) Home Phone Work Phone Mobile Phone    Garett Crane (Brother) -- -- [phone]            Discharge Summary     No notes of this type exist for this encounter.        Discharge Order     Start     Ordered    02/16/17 0759  Discharge patient  Once     Expected Discharge Date:  02/16/17    Discharge Disposition:  Rehab Facility or Unit (DC - External)        02/16/17 0801          "

## 2017-02-16 NOTE — PROGRESS NOTES
Acute Care - Occupational Therapy Treatment Note  [facility]     Patient Name: Kayleigh Thompson  : 1960  MRN: [id number]  Today's Date: 2017  Onset of Illness/Injury or Date of Surgery Date: 17  Date of Referral to OT: 17  Referring Physician: Dr. Carcamo      Admit Date: 2017    Visit Dx:     ICD-10-CM ICD-9-CM   1. Impaired functional mobility, balance, gait, and endurance Z74.09 V49.89   2. Impaired mobility and ADLs Z74.09 799.89   3. S/p reverse total shoulder arthroplasty, left Z96.612 V43.61     Patient Active Problem List   Diagnosis   • Closed 4-part fracture of proximal end of left humerus   • Tenosynovitis of left upper arm   • Sleep apnea   • GERD (gastroesophageal reflux disease)   • Bipolar disorder   • Arthritis   • Tobacco abuse   • Fever   • S/p left reverse total shoulder arthroplasty             Adult Rehabilitation Note       17 1618 17 1010 02/15/17 1130    Rehab Assessment/Intervention    Discipline occupational therapist  -AR physical therapy assistant  -UD occupational therapist  -SK    Document Type therapy note (daily note)   POD#3 left RTSA for proximal humerus FX  -AR therapy note (daily note)  -UD therapy note (daily note)  -SK    Subjective Information agree to therapy;complains of;pain  -AR agree to therapy;complains of;pain  -UD no complaints;agree to therapy  -SK    Patient Effort, Rehab Treatment good  -AR good  -UD good  -SK    Symptoms Noted During/After Treatment none  -AR increased pain  -UD increased pain  -SK    Symptoms Noted Comment   pt was pre-medicated  -SK    Precautions/Limitations non-weight bearing status;shoulder precautions   interscalene nerve catheter  -AR  non-weight bearing status   interscalene nerve cath L UE; ROM per MD protocol  -SK    Specific Treatment Considerations Pt now anticiapting DC home with  instead of rehab placement   -AR      Recorded by [AR] Marlena Agrawal, OT [UD] Katy Churchill, PTA [SK]  Meliza De La Cruz OTR    Vital Signs    Pre Patient Position  Supine  -UD     Intra Patient Position  Standing  -UD     Post Patient Position  Supine  -UD     Recorded by  [UD] Katy Churchill PTA     Pain Assessment    Pain Assessment 0-10  -AR  0-10  -SK    Pain Score 6  -AR  3  -SK    Post Pain Score 3  -AR  6  -SK    Pain Type Acute pain  -AR  Acute pain;Surgical pain  -SK    Pain Location Shoulder  -AR  Shoulder  -SK    Pain Orientation Left  -AR  Left  -SK    Pain Intervention(s) Medication (See MAR);Repositioned;Ambulation/increased activity  -AR  --   pre-medicated; nerve cath  -SK    Response to Interventions tolerated  -AR  tolerated  -SK    Recorded by [AR] Marlena Agrawal OT  [SK] Meliza De La Cruz OTR    Pain 2    Pain Score 2  3  -UD     Pre Tx Pain Score 2  4  -UD     Post Tx Pain Score 2  4  -UD     Pain Type 2  Acute pain  -UD     Pain Location 2  Shoulder  -UD     Pain Orientation 2  Left  -UD     Pain Intervention(s) 2  Repositioned  -UD     Recorded by  [UD] Katy Churchill PTA     Cognitive Assessment/Intervention    Current Cognitive/Communication Assessment functional  -AR  functional  -SK    Orientation Status oriented x 4  -AR  oriented x 4  -SK    Follows Commands/Answers Questions 100% of the time;able to follow single-step instructions  -AR  100% of the time  -SK    Personal Safety WNL/WFL  -AR  WNL/WFL  -SK    Personal Safety Interventions fall prevention program maintained  -AR  fall prevention program maintained  -SK    Recorded by [AR] Marlena Agrawal, OT  [SK] Meliza De La Cruz, OTR    Mobility Assessment/Training    Extremity Weight-Bearing Status left upper extremity  -AR  left upper extremity  -SK    Left Upper Extremity Weight-Bearing non weight-bearing  -AR  non weight-bearing  -SK    Recorded by [AR] Marlena Agrawal OT  [SK] Meliza De La Cruz, OTR    Bed Mobility, Assessment/Treatment    Bed Mobility, Assistive Device head of bed elevated  -AR      Bed Mob, Supine to Sit,  [place] conditional independence  -AR independent  -UD     Bed Mob, Sit to Supine, [place] conditional independence  -AR independent  -UD     Bed Mobility, Comment pt maintained NWB LUE with independence   -AR      Recorded by [AR] Marlena Agrawal, OT [UD] Katy Churchill, PTA     Transfer Assessment/Treatment    Transfers, Sit-Stand [place] supervision required  -AR stand by assist  -UD     Transfers, Stand-Sit [place] supervision required  -AR stand by assist  -UD     Transfers, Sit-Stand-Sit, Assist Device straight cane  -AR straight cane  -UD     Toilet Transfer, [place] supervision required  -AR      Toilet Transfer, Assistive Device straight cane  -AR      Recorded by [AR] Marlena Agrawal, OT [UD] Katy Churchill, PTA     Gait Assessment/Treatment    Gait, [place] Level  contact guard assist  -UD     Gait, Assistive Device  straight cane  -UD     Gait, Distance (Feet)  400  -UD     Gait, Gait Deviations  ataxia  -UD     Gait, Safety Issues  balance decreased during turns  -UD     Gait, Impairments  strength decreased  -UD     Recorded by  [UD] Katy Churchill, LUPE     Functional Mobility    Functional Mobility- Ind. Level contact guard assist  -AR      Functional Mobility- Device straight cane  -AR      Recorded by [AR] Marlena Agrawal, OT      Upper Body Bathing Assessment/Training    UB Bathing Assess/Train, Comment reviewed left axilla care  -AR      Recorded by [AR] Marlena Agrawal OT      Lower Body Bathing Assessment/Training    LB Bathing Assess/Train, Comment   OT completed education on axillary care   -SK    Recorded by   [SK] Meliza De La Cruz OTR    Upper Body Dressing Assessment/Training    UB Dressing Assess/Train, Clothing Type doffing:;donning:   sling  -AR  doffing:;donning:   sling  -SK    UB Dressing Assess/Train, Assist Device alley technique  -AR  alley technique  -SK    UB Dressing Assess/Train, Position supine  -AR  supine  -SK    UB Dressing Assess/Train,  [place] maximum assist (25% patient effort);verbal cues required  -AR  maximum assist (25% patient effort)  -SK    UB Dressing Assess/Train, Impairments ROM decreased;pain   shoulder precautions  -AR  ROM decreased;pain  -SK    UB Dressing Assess/Train, Comment Educated pt on shoulder precautions and maintainng shoulder precautions while completing ADLs. Educated pt on donning/doffing sling. Pt unable to complete by pulling sling overhead. Post-eduaction, pt able to verablize with min cues correct technique to don/doff sling. No family present for teaching. Educated pt on care of ON-Q ball during UB ADLs to avoid dislodgement.    -AR  reinforced education of donning/doffing sling and no AROM shoulder for assistance  -SK    Recorded by [AR] Marlena Agrawal OT  [SK] Meliza De La Cruz OTR    Toileting Assessment/Training    Toileting Assess/Train, Assistive Device grab bars  -AR      Toileting Assess/Train, Position standing;sitting  -AR      Toileting Assess/Train, Indepen Level supervision required   clothing management and post-toilet hygiene  -AR      Recorded by [AR] Marlena Agrawal OT      Grooming Assessment/Training    Grooming Assess/Train, Position standing  -AR      Grooming Assess/Train, Indepen Level supervision required  -AR      Recorded by [AR] Marlena Agrawal OT      Therapy Exercises    Left Upper Extremity AROM:;10 reps;supine;elbow flexion/extension;hand pumps;pronation/supination;shoulder protraction/retraction;PROM:;shoulder extension/flexion;shoulder ER/IR   Tolerated PROM , IR chest/ER 20; no family present   -AR  PROM:;AAROM:;AROM:;10 reps;supine;elbow flexion/extension;hand pumps;pronation/supination;shoulder ER/IR;other reps   AROM digit, wrist, elbow F/E, pronation/supination...  -SK    LUE Other Reps   --   AROM scap retractions, PROM IR to chest, ER to 30....  -SK    LUE Resistance   --   FE to ~140 d/t pain tolerance  -SK    Bilateral Upper Extremity   --   AAROM  req. on elbow f/e to iniaite then progressed to arom  -SK    Exercise Protocols --   Educated pt on SROM, she completed with vc and min assist  -AR      Recorded by [AR] Marlena Agrawal, OT  [SK] Meliza De La Cruz, OTR    Positioning and Restraints    Pre-Treatment Position in bed  -AR in bed  -UD in bed  -SK    Post Treatment Position bed  -AR bed  -UD bed  -SK    In Bed supine;call light within reach;encouraged to call for assist;exit alarm on;with brace  -AR notified nsg;supine;call light within reach;side rails up x2  -UD supine;call light within reach;encouraged to call for assist;with nsg   L sling in place, pillow for support, ice to incision site  -SK    Recorded by [AR] Marlena Agrawal, OT [UD] Katy Churchill, PTA [SK] Meliza De La Cruz, OTR      User Key  (r) = Recorded By, (t) = Taken By, (c) = Cosigned By    Initials Name Effective Dates    UD Katy Churchill, PTA 06/22/15 -     SK Meliza De La Cruz, OTR 05/18/15 -     AR Marlena Agrawal, OT 06/22/15 -                 OT Goals       02/16/17 1727 02/15/17 1130 02/14/17 1712    Transfer Training OT LTG    Transfer Training OT LTG, Date Established   02/14/17  -AR    Transfer Training OT LTG, Time to Achieve   1 wk  -AR    Transfer Training OT LTG, Activity Type   sit to stand/stand to sit;toilet  -AR    Transfer Training OT LTG, [place] Level   supervision required;verbal cues required  -AR    Transfer Training OT LTG, Assist Device   cane, straight  -AR    Transfer Training OT LTG, Outcome goal met  -AR goal ongoing  -SK     Range of Motion OT LTG    Range of Motion Goal OT LTG, Date Established   02/14/17  -AR    Range of Motion Goal OT LTG, Time to Achieve   1 wk  -AR    Range of Motion Goal OT LTG, AROM Measure   Pt will particiapte in LUE HEP within physician parameters daily to support ADL function   -AR    Range of Motion Goal OT LTG, Outcome goal ongoing  -AR goal ongoing   pt progressing  -SK     Patient Education OT LTG    Patient  Education OT LTG, Date Established   02/14/17  -AR    Patient Education OT LTG, Time to Achieve   1 wk  -AR    Patient Education OT LTG, Education Type   written program;HEP;precautions per surgeon;1 hand/alley technique  -AR    Patient Education OT LTG, Education Understanding   demonstrates adequately;verbalizes understanding  -AR    Patient Education OT LTG Outcome goal ongoing  -AR goal ongoing   progressing  -SK     UB Dressing OT LTG    UB Dressing Goal OT LTG, Date Established   02/14/17  -AR    UB Dressing Goal OT LTG, Time to Achieve   1 wk  -AR    UB Dressing Goal OT LTG, Activity Type   Pt will don/doff LUE sling  -AR    UB Dressing Goal OT LTG, [place] Level   verbal cues required;moderate assist (50% patient effort)  -AR    UB Dressing Goal OT LTG, Outcome goal ongoing  -AR goal ongoing  -SK       User Key  (r) = Recorded By, (t) = Taken By, (c) = Cosigned By    Initials Name Provider Type    SK Meliaz De La Cruz, OTR Occupational Therapist    AR Marlena Agrawal, OT Occupational Therapist          Occupational Therapy Education     Title: PT OT SLP Therapies (Done)     Topic: Occupational Therapy (Done)     Point: ADL training (Done)    Description: Instruct learner(s) on proper safety adaptation and remediation techniques during self care or transfers.   Instruct in proper use of assistive devices.    Learning Progress Summary    Learner Readiness Method Response Comment Documented by Status   Patient CYRUS Moseley D NR, VU reviewed shoudler precautions, ADL retraining to maintain, care of ON-Q ball with ADLS, transfer training, SROM and LUE HEP AR 02/16/17 1726 Done    Acceptance LYNDON BRIDGES D VU, DU  SK 02/15/17 1337 Done    CYRUS Moseley D,H GONZALES TO Reviewed left shoulder precautions, ADL retraining to maintain, care of ON-Q ball during ADL activities, LUE HEP, NWB LUE, bed mobility, transfer training, DC recommendation of rehab AR 02/14/17 1710 Done               Point: Home exercise program (Done)     Description: Instruct learner(s) on appropriate technique for monitoring, assisting and/or progressing therapeutic exercises/activities.    Learning Progress Summary    Learner Readiness Method Response Comment Documented by Status   Patient CYRUS Moseley D NR,ZULEIKA reviewed shoudler precautions, ADL retraining to maintain, care of ON-Q ball with ADLS, transfer training, SROM and LUE HEP AR 02/16/17 1726 Done    Acceptance LYNDON BRIDGES D VU, DU  SK 02/15/17 1337 Done    CYRUS Moseley D,H GONZALES TO Reviewed left shoulder precautions, ADL retraining to maintain, care of ON-Q ball during ADL activities, LUE HEP, NWB LUE, bed mobility, transfer training, DC recommendation of rehab AR 02/14/17 1710 Done               Point: Precautions (Done)    Description: Instruct learner(s) on prescribed precautions during self-care and functional transfers.    Learning Progress Summary    Learner Readiness Method Response Comment Documented by Status   Patient CYRUS Moseley D NR,ZULEIKA reviewed shoudler precautions, ADL retraining to maintain, care of ON-Q ball with ADLS, transfer training, SROM and LUE HEP AR 02/16/17 1726 Done    CYRUS Moseley D,H GONZALES TO Reviewed left shoulder precautions, ADL retraining to maintain, care of ON-Q ball during ADL activities, LUE HEP, NWB LUE, bed mobility, transfer training, DC recommendation of rehab AR 02/14/17 1710 Done               Point: Body mechanics (Done)    Description: Instruct learner(s) on proper positioning and spine alignment during self-care, functional mobility activities and/or exercises.    Learning Progress Summary    Learner Readiness Method Response Comment Documented by Status   Patient CYRUS Moseley D NR,ZULEIKA reviewed shoudler precautions, ADL retraining to maintain, care of ON-Q ball with ADLS, transfer training, SROM and LUE HEP AR 02/16/17 1726 Done    CYRUS Moseley D,H GONZALES TO Reviewed left shoulder precautions, ADL retraining to maintain, care of ON-Q ball during ADL activities, LUE HEP, NWB LUE, bed mobility,  transfer training, DC recommendation of rehab AR 02/14/17 1710 Done                      User Key     Initials Effective Dates Name Provider Type Discipline    SK 05/18/15 -  Meliza De La Cruz OTR Occupational Therapist OT    AR 06/22/15 -  Marlena Agrawal, OT Occupational Therapist OT                  OT Recommendation and Plan  Anticipated Discharge Disposition: inpatient rehabilitation facility  Therapy Frequency: daily (per priority policy)  Plan of Care Review  Plan Of Care Reviewed With: patient  Progress: improving  Outcome Summary/Follow up Plan: Pt now anticipates DC home with assist from family, recommend HHOT/PT to assist. No family present for teaching. Educated pt on donning/doffing sling, she requires max assist yet was able to verbalize to a caregiver proper application with min cues. Educated pt on SROM while completing LUE HEP. OT achieved PROM , IR chest and ER 20; pt achieved FE 90, IR chest and ER 10. C/o pain 6 at start and 3/10 at end of session. Should rehab bed become available, recommend IP rehab in preparation for safe DC home given current amout of assistance pt needs with sling management, h/o falls and current assist level needed with ADL activity.         Outcome Measures       02/16/17 1618 02/16/17 1010 02/15/17 1130    How much help from another person do you currently need...    Turning from your back to your side while in flat bed without using bedrails?  4  -UD     Moving from lying on back to sitting on the side of a flat bed without bedrails?  4  -UD     Moving to and from a bed to a chair (including a wheelchair)?  3  -UD     Standing up from a chair using your arms (e.g., wheelchair, bedside chair)?  3  -UD     Climbing 3-5 steps with a railing?  2  -UD     To walk in hospital room?  3  -UD     AM-PAC 6 Clicks Score  19  -UD     How much help from another is currently needed...    Putting on and taking off regular lower body clothing? 2  -AR  1  -SK    Bathing  (including washing, rinsing, and drying) 3  -AR  2  -SK    Toileting (which includes using toilet bed pan or urinal) 3  -AR  3  -SK    Putting on and taking off regular upper body clothing 2  -AR  2  -SK    Taking care of personal grooming (such as brushing teeth) 3  -AR  3  -SK    Eating meals 3  -AR  3  -SK    Score 16  -AR  14  -SK    Functional Assessment    Outcome Measure Options AM-PAC 6 Clicks Daily Activity (OT)  -AR        02/14/17 1528 02/14/17 1330       How much help from another person do you currently need...    Turning from your back to your side while in flat bed without using bedrails?  3  -SC (r) LB (t) SC (c)     Moving from lying on back to sitting on the side of a flat bed without bedrails?  3  -SC (r) LB (t) SC (c)     Moving to and from a bed to a chair (including a wheelchair)?  4  -SC (r) LB (t) SC (c)     Standing up from a chair using your arms (e.g., wheelchair, bedside chair)?  4  -SC (r) LB (t) SC (c)     Climbing 3-5 steps with a railing?  3  -SC (r) LB (t) SC (c)     To walk in hospital room?  3  -SC (r) LB (t) SC (c)     AM-PAC 6 Clicks Score  20  -SC (r) LB (t)     How much help from another is currently needed...    Putting on and taking off regular lower body clothing? 1  -AR      Bathing (including washing, rinsing, and drying) 2  -AR      Toileting (which includes using toilet bed pan or urinal) 3  -AR      Putting on and taking off regular upper body clothing 2  -AR      Taking care of personal grooming (such as brushing teeth) 3  -AR      Eating meals 3  -AR      Score 14  -AR      Functional Assessment    Outcome Measure Options AM-PAC 6 Clicks Daily Activity (OT)  -AR AM-PAC 6 Clicks Basic Mobility (PT)  -SC (r) LB (t) SC (c)       User Key  (r) = Recorded By, (t) = Taken By, (c) = Cosigned By    Initials Name Provider Type    ASHLI Costa, PT Physical Therapist    LUDWIN Churchill, PTA Physical Therapy Assistant    MERARY De La Cruz, OTR Occupational Therapist    AR  Marlena Agrawal OT Occupational Therapist    KAYLAH Barbosa, PT Student PT Student           Time Calculation:         Time Calculation- OT       02/16/17 1734          Time Calculation- OT    OT Start Time 1618  -AR      Total Timed Code Minutes- OT 40 minute(s)  -AR      OT Received On 02/16/17  -AR      OT Goal Re-Cert Due Date 02/24/17  -AR        User Key  (r) = Recorded By, (t) = Taken By, (c) = Cosigned By    Initials Name Provider Type    AR Marlena Agrawal OT Occupational Therapist           Therapy Charges for Today     Code Description Service Date Service Provider Modifiers Qty    [id number]  OT THERAPEUTIC ACT EA 15 MIN 2/16/2017 Marlena Agrawal OT GO 3               Marlena Agrawal OT  2/16/2017

## 2017-02-17 VITALS
SYSTOLIC BLOOD PRESSURE: 97 MMHG | RESPIRATION RATE: 16 BRPM | WEIGHT: 160 LBS | DIASTOLIC BLOOD PRESSURE: 60 MMHG | OXYGEN SATURATION: 92 % | HEIGHT: 66 IN | HEART RATE: 98 BPM | TEMPERATURE: 97.7 F | BODY MASS INDEX: 25.71 KG/M2

## 2017-02-17 LAB
ANION GAP SERPL CALCULATED.3IONS-SCNC: 3 MMOL/L (ref 3–11)
BASOPHILS # BLD AUTO: 0.02 10*3/MM3 (ref 0–0.2)
BASOPHILS NFR BLD AUTO: 0.2 % (ref 0–1)
BUN BLD-MCNC: 9 MG/DL (ref 9–23)
BUN/CREAT SERPL: 18 (ref 7–25)
CALCIUM SPEC-SCNC: 9 MG/DL (ref 8.7–10.4)
CHLORIDE SERPL-SCNC: 107 MMOL/L (ref 99–109)
CO2 SERPL-SCNC: 28 MMOL/L (ref 20–31)
CREAT BLD-MCNC: 0.5 MG/DL (ref 0.6–1.3)
DEPRECATED RDW RBC AUTO: 53.4 FL (ref 37–54)
EOSINOPHIL # BLD AUTO: 0.23 10*3/MM3 (ref 0.1–0.3)
EOSINOPHIL NFR BLD AUTO: 2.6 % (ref 0–3)
ERYTHROCYTE [DISTWIDTH] IN BLOOD BY AUTOMATED COUNT: 14.9 % (ref 11.3–14.5)
GFR SERPL CREATININE-BSD FRML MDRD: 127 ML/MIN/1.73
GLUCOSE BLD-MCNC: 110 MG/DL (ref 70–100)
HCT VFR BLD AUTO: 30 % (ref 34.5–44)
HGB BLD-MCNC: 9 G/DL (ref 11.5–15.5)
IMM GRANULOCYTES # BLD: 0.03 10*3/MM3 (ref 0–0.03)
IMM GRANULOCYTES NFR BLD: 0.3 % (ref 0–0.6)
LYMPHOCYTES # BLD AUTO: 2.15 10*3/MM3 (ref 0.6–4.8)
LYMPHOCYTES NFR BLD AUTO: 24.5 % (ref 24–44)
MAGNESIUM SERPL-MCNC: 2.3 MG/DL (ref 1.3–2.7)
MCH RBC QN AUTO: 29.7 PG (ref 27–31)
MCHC RBC AUTO-ENTMCNC: 30 G/DL (ref 32–36)
MCV RBC AUTO: 99 FL (ref 80–99)
MONOCYTES # BLD AUTO: 0.67 10*3/MM3 (ref 0–1)
MONOCYTES NFR BLD AUTO: 7.6 % (ref 0–12)
NEUTROPHILS # BLD AUTO: 5.68 10*3/MM3 (ref 1.5–8.3)
NEUTROPHILS NFR BLD AUTO: 64.8 % (ref 41–71)
PLATELET # BLD AUTO: 427 10*3/MM3 (ref 150–450)
PMV BLD AUTO: 8.5 FL (ref 6–12)
POTASSIUM BLD-SCNC: 4.1 MMOL/L (ref 3.5–5.5)
RBC # BLD AUTO: 3.03 10*6/MM3 (ref 3.89–5.14)
SODIUM BLD-SCNC: 138 MMOL/L (ref 132–146)
WBC NRBC COR # BLD: 8.78 10*3/MM3 (ref 3.5–10.8)

## 2017-02-17 PROCEDURE — 83735 ASSAY OF MAGNESIUM: CPT | Performed by: FAMILY MEDICINE

## 2017-02-17 PROCEDURE — 94660 CPAP INITIATION&MGMT: CPT

## 2017-02-17 PROCEDURE — 97530 THERAPEUTIC ACTIVITIES: CPT | Performed by: OCCUPATIONAL THERAPIST

## 2017-02-17 PROCEDURE — 99239 HOSP IP/OBS DSCHRG MGMT >30: CPT | Performed by: PHYSICIAN ASSISTANT

## 2017-02-17 PROCEDURE — 80048 BASIC METABOLIC PNL TOTAL CA: CPT | Performed by: FAMILY MEDICINE

## 2017-02-17 PROCEDURE — 94799 UNLISTED PULMONARY SVC/PX: CPT

## 2017-02-17 PROCEDURE — 97116 GAIT TRAINING THERAPY: CPT

## 2017-02-17 PROCEDURE — 85025 COMPLETE CBC W/AUTO DIFF WBC: CPT | Performed by: FAMILY MEDICINE

## 2017-02-17 PROCEDURE — 97110 THERAPEUTIC EXERCISES: CPT

## 2017-02-17 RX ORDER — PSEUDOEPHEDRINE HCL 30 MG
100 TABLET ORAL 2 TIMES DAILY PRN
Qty: 60 CAPSULE | Refills: 0 | Status: SHIPPED | OUTPATIENT
Start: 2017-02-17

## 2017-02-17 RX ORDER — NICOTINE 21 MG/24HR
1 PATCH, TRANSDERMAL 24 HOURS TRANSDERMAL EVERY 24 HOURS
Qty: 21 PATCH | Refills: 0 | Status: SHIPPED | OUTPATIENT
Start: 2017-02-17

## 2017-02-17 RX ADMIN — OXYCODONE HYDROCHLORIDE AND ACETAMINOPHEN 2 TABLET: 10; 325 TABLET ORAL at 11:38

## 2017-02-17 RX ADMIN — GABAPENTIN 800 MG: 400 CAPSULE ORAL at 13:43

## 2017-02-17 RX ADMIN — BUPROPION HYDROCHLORIDE 100 MG: 100 TABLET, FILM COATED ORAL at 05:09

## 2017-02-17 RX ADMIN — GABAPENTIN 800 MG: 400 CAPSULE ORAL at 05:09

## 2017-02-17 RX ADMIN — CLONAZEPAM 2 MG: 1 TABLET ORAL at 08:09

## 2017-02-17 RX ADMIN — ATORVASTATIN CALCIUM 80 MG: 40 TABLET, FILM COATED ORAL at 09:48

## 2017-02-17 RX ADMIN — CLONAZEPAM 2 MG: 1 TABLET ORAL at 00:48

## 2017-02-17 RX ADMIN — ESCITALOPRAM OXALATE 10 MG: 10 TABLET ORAL at 09:48

## 2017-02-17 RX ADMIN — OXYCODONE HYDROCHLORIDE AND ACETAMINOPHEN 2 TABLET: 10; 325 TABLET ORAL at 14:56

## 2017-02-17 RX ADMIN — ZIPRASIDONE HYDROCHLORIDE 80 MG: 20 CAPSULE ORAL at 09:54

## 2017-02-17 RX ADMIN — BUPROPION HYDROCHLORIDE 100 MG: 100 TABLET, FILM COATED ORAL at 14:56

## 2017-02-17 RX ADMIN — OXYCODONE HYDROCHLORIDE AND ACETAMINOPHEN 2 TABLET: 10; 325 TABLET ORAL at 08:09

## 2017-02-17 RX ADMIN — OXYCODONE HYDROCHLORIDE AND ACETAMINOPHEN 2 TABLET: 10; 325 TABLET ORAL at 03:00

## 2017-02-17 RX ADMIN — CLONAZEPAM 2 MG: 1 TABLET ORAL at 14:56

## 2017-02-17 NOTE — PLAN OF CARE
Problem: Mobility, Physical Impaired (Adult)  Intervention: Monitor/Assist with Self Care    02/17/17 0306   Activity   Activity Type activity adjusted per tolerance   Activity Level of Assistance assistance, 1 person   Musculoskeletal Interventions   Self-Care Promotion BADL personal objects within reach;BADL personal routines maintained;safe use of adaptive equipment encouraged   Monitor/Assist with Self Care   Ambulation 3-->assistive equipment and person   Transferring 3-->assistive equipment and person   Toileting 2-->assistive person   Bathing 2-->assistive person   Dressing 2-->assistive person   Eating 0-->independent   Communication 0-->understands/communicates without difficulty   Swallowing (if score 2 or more for any item, consult Rehab Services) 0-->swallows foods/liquids without difficulty

## 2017-02-17 NOTE — PLAN OF CARE
Problem: Patient Care Overview (Adult)  Goal: Plan of Care Review  Outcome: Ongoing (interventions implemented as appropriate)    02/17/17 1315   Coping/Psychosocial Response Interventions   Plan Of Care Reviewed With patient   Outcome Evaluation   Outcome Summary/Follow up Plan pt with excellent teach back and ability to complete alley dressing; d/c'ing home this date with HH and assist of her mother          Problem: Inpatient Occupational Therapy  Goal: Transfer Training Goal 1 LTG- OT  Outcome: Outcome(s) achieved Date Met:  02/17/17 02/14/17 1712 02/17/17 1315   Transfer Training OT LTG   Transfer Training OT LTG, Date Established 02/14/17 --    Transfer Training OT LTG, Time to Achieve 1 wk --    Transfer Training OT LTG, Activity Type sit to stand/stand to sit;toilet --    Transfer Training OT LTG, [place] Level supervision required;verbal cues required --    Transfer Training OT LTG, Assist Device cane, straight --    Transfer Training OT LTG, Outcome --  goal met       Goal: Range of Motion Goal LTG- OT  Outcome: Outcome(s) achieved Date Met:  02/17/17 02/17/17 1315   Range of Motion OT LTG   Range of Motion Goal OT LTG, Outcome goal met         02/14/17 1712 02/17/17 1315   Range of Motion OT LTG   Range of Motion Goal OT LTG, Date Established 02/14/17 --    Range of Motion Goal OT LTG, Time to Achieve 1 wk --    Range of Motion Goal OT LTG, AROM Measure Pt will particiapte in LUE HEP within physician parameters daily to support ADL function  --    Range of Motion Goal OT LTG, Outcome --  goal met       Goal: Patient Education Goal LTG- OT  Outcome: Outcome(s) achieved Date Met:  02/17/17 02/14/17 1712 02/17/17 1315   Patient Education OT LTG   Patient Education OT LTG, Date Established 02/14/17 --    Patient Education OT LTG, Time to Achieve 1 wk --    Patient Education OT LTG, Education Type written program;HEP;precautions per surgeon;1 hand/alley technique --    Patient Education OT LTG,  Education Understanding demonstrates adequately;verbalizes understanding --    Patient Education OT LTG Outcome --  goal met       Goal: UB Dressing Goal LTG- OT  Outcome: Outcome(s) achieved Date Met:  02/17/17 02/17/17 1315   UB Dressing OT LTG   UB Dressing Goal OT LTG, Outcome goal met

## 2017-02-17 NOTE — PROGRESS NOTES
Continued Stay Note  [facility]     Patient Name: Kayleigh Thompson  MRN: [id number]  Today's Date: 2/17/2017    Admit Date: 2/13/2017          Discharge Plan       02/17/17 1453    Case Management/Social Work Plan    Plan Home with [facility] from [facility]    Patient/Family In Agreement With Plan yes    Additional Comments Received referral for [facility] for Ms. Thompson.  Ms. Thompson given list of providers and she chose [facility] from the list.  Faxed orders and clinical to [facility].  [facility], ph [phone], will contact Ms. Thompson after discharge and meet her in the home to set up home concentrator for night time use.  Discharge plan is home with [facility], [facility] and family assistance.              Discharge Codes     None        Expected Discharge Date and Time     Expected Discharge Date Expected Discharge Time    Feb 16, 2017             Daysi Rawls

## 2017-02-17 NOTE — PLAN OF CARE
Problem: Patient Care Overview (Adult)  Goal: Plan of Care Review  Outcome: Ongoing (interventions implemented as appropriate)    02/17/17 0919   Coping/Psychosocial Response Interventions   Plan Of Care Reviewed With patient   Outcome Evaluation   Outcome Summary/Follow up Plan Pt improvig from humeral fx and shoulder surgery. Pt ambulating with straight cane. Safety concerns for being mildly impulsive. LE Home Exercises reviewed with pt. Ambulation distance increasing, Supervision needed.   Patient Care Overview   Progress improving         Problem: Inpatient Physical Therapy  Goal: Gait Training Goal LTG- PT  Outcome: Ongoing (interventions implemented as appropriate)    02/14/17 1504 02/17/17 0919   Gait Training PT LTG   Gait Training Goal PT LTG, Date Established 02/14/17 --    Gait Training Goal PT LTG, Time to Achieve 5 days --    Gait Training Goal PT LTG, [place] Level supervision required --    Gait Training Goal PT LTG, Assist Device cane, straight --    Gait Training Goal PT LTG, Distance to Achieve 500 ft. --    Gait Training Goal PT LTG, Outcome --  goal ongoing       Goal: Stair Training Goal LTG- PT  Outcome: Ongoing (interventions implemented as appropriate)    02/14/17 1504 02/17/17 0919   Stair Training PT LTG   Stair Training Goal PT LTG, Date Established 02/14/17 --    Stair Training Goal PT LTG, Time to Achieve 5 days --    Stair Training Goal PT LTG, Number of Steps 10 --    Stair Training Goal PT LTG, [place] Level conditional independence --    Stair Training Goal PT LTG, Assist Device 1 handrail --    Stair Training Goal PT LTG, Outcome --  goal ongoing

## 2017-02-17 NOTE — PROGRESS NOTES
Acute Care - Physical Therapy Treatment Note  [facility]     Patient Name: Kayleigh Thompson  : 1960  MRN: [id number]  Today's Date: 2017  Onset of Illness/Injury or Date of Surgery Date: 17  Date of Referral to PT: 17  Referring Physician: Dr. Carcamo    Admit Date: 2017    Visit Dx:    ICD-10-CM ICD-9-CM   1. Impaired functional mobility, balance, gait, and endurance Z74.09 V49.89   2. Impaired mobility and ADLs Z74.09 799.89   3. S/p reverse total shoulder arthroplasty, left Z96.612 V43.61     Patient Active Problem List   Diagnosis   • Closed 4-part fracture of proximal end of left humerus   • Tenosynovitis of left upper arm   • Sleep apnea   • GERD (gastroesophageal reflux disease)   • Bipolar disorder   • Arthritis   • Tobacco abuse   • Fever   • S/p left reverse total shoulder arthroplasty               Adult Rehabilitation Note       17 0825 17 1618 17 1010    Rehab Assessment/Intervention    Discipline physical therapist  -BD occupational therapist  -AR physical therapy assistant  -UD    Document Type therapy note (daily note)  -BD therapy note (daily note)   POD#3 left RTSA for proximal humerus FX  -AR therapy note (daily note)  -UD    Subjective Information agree to therapy;complains of;pain  -BD agree to therapy;complains of;pain  -AR agree to therapy;complains of;pain  -UD    Patient Effort, Rehab Treatment good  -BD good  -AR good  -UD    Symptoms Noted During/After Treatment none  -BD none  -AR increased pain  -UD    Precautions/Limitations non-weight bearing status;shoulder precautions;other (see comments)   interscalene nerve catheter  -BD non-weight bearing status;shoulder precautions   interscalene nerve catheter  -AR     Specific Treatment Considerations  Pt now anticiapting DC home with HH instead of rehab placement   -AR     Recorded by [BD] Carlene Petersen, PT [AR] Marlena Agrawal, OT [UD] Katy Churchill PTA    Vital Signs    Pre Systolic  BP Rehab 130  -BD      Pre Treatment Diastolic BP 78  -BD      Pre Patient Position Sitting  -BD  Supine  -UD    Intra Patient Position Standing  -BD  Standing  -UD    Post Patient Position Sitting  -BD  Supine  -UD    Recorded by [BD] Carlene Petersen, PT  [UD] Katy Churchill PTA    Pain Assessment    Pain Assessment 0-10  -BD 0-10  -AR     Pain Score 5  -BD 6  -AR     Post Pain Score 5  -BD 3  -AR     Pain Type Acute pain  -BD Acute pain  -AR     Pain Location Shoulder  -BD Shoulder  -AR     Pain Orientation Left  -BD Left  -AR     Pain Intervention(s) Medication (See MAR);Repositioned;Ambulation/increased activity  -BD Medication (See MAR);Repositioned;Ambulation/increased activity  -AR     Response to Interventions tolerated   -BD tolerated  -AR     Recorded by [BD] Carlene Petersen, PT [AR] Marlena Agrawal OT     Pain 2    Pain Score 2   3  -UD    Pre Tx Pain Score 2   4  -UD    Post Tx Pain Score 2   4  -UD    Pain Type 2   Acute pain  -UD    Pain Location 2   Shoulder  -UD    Pain Orientation 2   Left  -UD    Pain Intervention(s) 2   Repositioned  -UD    Recorded by   [UD] Katy Churchill PTA    Cognitive Assessment/Intervention    Current Cognitive/Communication Assessment functional  -BD functional  -AR     Orientation Status oriented x 4  -BD oriented x 4  -AR     Follows Commands/Answers Questions 100% of the time;able to follow single-step instructions  -% of the time;able to follow single-step instructions  -AR     Personal Safety WNL/WFL  -BD WNL/WFL  -AR     Personal Safety Interventions fall prevention program maintained;gait belt;nonskid shoes/slippers when out of bed  -BD fall prevention program maintained  -AR     Recorded by [BD] Carlene Petersen, PT [AR] Marlena Agrawal, SALAZAR     Mobility Assessment/Training    Extremity Weight-Bearing Status  left upper extremity  -AR     Left Upper Extremity Weight-Bearing  non weight-bearing  -AR     Recorded by  [AR] Marlena Agrawal, OT      Bed Mobility, Assessment/Treatment    Bed Mobility, Assistive Device  head of bed elevated  -AR     Bed Mob, Supine to Sit, [place]  conditional independence  -AR independent  -UD    Bed Mob, Sit to Supine, [place]  conditional independence  -AR independent  -UD    Bed Mobility, Comment Pt sitting up on EOB prior to PT  -BD pt maintained NWB LUE with independence   -AR     Recorded by [BD] Carlene Petersen, PT [AR] Marlena Agrawal, OT [UD] Katy Churchill, PTA    Transfer Assessment/Treatment    Transfers, Sit-Stand [place] supervision required  -BD supervision required  -AR stand by assist  -UD    Transfers, Stand-Sit [place] supervision required;verbal cues required  -BD supervision required  -AR stand by assist  -UD    Transfers, Sit-Stand-Sit, Assist Device straight cane  -BD straight cane  -AR straight cane  -UD    Toilet Transfer, [place]  supervision required  -AR     Toilet Transfer, Assistive Device  straight cane  -AR     Recorded by [BD] Carlene Petersen, PT [AR] Marlena Agrawal, OT [UD] Katy Churchill, PTA    Gait Assessment/Treatment    Gait, [place] Level contact guard assist;verbal cues required  -BD  contact guard assist  -UD    Gait, Assistive Device straight cane  -BD  straight cane  -UD    Gait, Distance (Feet) 450  -BD  400  -UD    Gait, Gait Pattern Analysis swing-through gait  -BD      Gait, Gait Deviations forward flexed posture;narrow base  -BD  ataxia  -UD    Gait, Safety Issues balance decreased during turns  -BD  balance decreased during turns  -UD    Gait, Impairments strength decreased  -BD  strength decreased  -UD    Recorded by [BD] Carlene Petersen, PT  [UD] Katy Churchill, PTA    Functional Mobility    Functional Mobility- Ind. Level contact guard assist  -BD contact guard assist  -AR     Functional Mobility- Device straight cane  -BD straight cane  -AR     Recorded by [BD] Carlene Petersen, PT [AR] Marlena Agrawal, OT     Upper Body Bathing  Assessment/Training    UB Bathing Assess/Train, Comment  reviewed left axilla care  -AR     Recorded by  [AR] Marlena Agrawal OT     Upper Body Dressing Assessment/Training    UB Dressing Assess/Train, Clothing Type  doffing:;donning:   sling  -AR     UB Dressing Assess/Train, Assist Device  alley technique  -AR     UB Dressing Assess/Train, Position  supine  -AR     UB Dressing Assess/Train, [place]  maximum assist (25% patient effort);verbal cues required  -AR     UB Dressing Assess/Train, Impairments  ROM decreased;pain   shoulder precautions  -AR     UB Dressing Assess/Train, Comment  Educated pt on shoulder precautions and maintainng shoulder precautions while completing ADLs. Educated pt on donning/doffing sling. Pt unable to complete by pulling sling overhead. Post-eduaction, pt able to verablize with min cues correct technique to don/doff sling. No family present for teaching. Educated pt on care of ON-Q ball during UB ADLs to avoid dislodgement.    -AR     Recorded by  [AR] Marlena Agrawal OT     Toileting Assessment/Training    Toileting Assess/Train, Assistive Device  grab bars  -AR     Toileting Assess/Train, Position  standing;sitting  -AR     Toileting Assess/Train, [place] Level  supervision required   clothing management and post-toilet hygiene  -AR     Recorded by  [AR] Marlena Agrawal OT     Grooming Assessment/Training    Grooming Assess/Train, Position  standing  -AR     Grooming Assess/Train, [place] Level  supervision required  -AR     Recorded by  [AR] Marlena Agrawal OT     Therapy Exercises    Bilateral Lower Extremities 10 reps;sitting;ankle pumps/circles;quad sets  -BD      Left Upper Extremity  AROM:;10 reps;supine;elbow flexion/extension;hand pumps;pronation/supination;shoulder protraction/retraction;PROM:;shoulder extension/flexion;shoulder ER/IR   Tolerated PROM , IR chest/ER 20; no family present   -AR     Exercise Protocols  --   Educated pt on SROM, she  completed with vc and min assist  -AR     Recorded by [BD] Carlene Petersen, PT [AR] Marlena Agrawal, SALAZAR     Positioning and Restraints    Pre-Treatment Position other (comment)  -BD in bed  -AR in bed  -UD    Post Treatment Position chair   Sitting on EOB  -BD bed  -AR bed  -UD    In Bed  supine;call light within reach;encouraged to call for assist;exit alarm on;with brace  -AR notified nsg;supine;call light within reach;side rails up x2  -UD    In Chair notified nsg;reclined;call light within reach;encouraged to call for assist;legs elevated  -BD      Recorded by [BD] Carlene Petersen, PT [AR] Marlena Agrawal, SALAZAR [UD] Katy Churchill, [facility]      02/15/17 1130          Rehab Assessment/Intervention    Discipline occupational therapist  -SK      Document Type therapy note (daily note)  -SK      Subjective Information no complaints;agree to therapy  -SK      Patient Effort, Rehab Treatment good  -SK      Symptoms Noted During/After Treatment increased pain  -SK      Symptoms Noted Comment pt was pre-medicated  -SK      Precautions/Limitations non-weight bearing status   interscalene nerve cath L UE; ROM per MD protocol  -SK      Recorded by [SK] RACHEL Hussein      Pain Assessment    Pain Assessment 0-10  -SK      Pain Score 3  -SK      Post Pain Score 6  -SK      Pain Type Acute pain;Surgical pain  -SK      Pain Location Shoulder  -SK      Pain Orientation Left  -SK      Pain Intervention(s) --   pre-medicated; nerve cath  -SK      Response to Interventions tolerated  -SK      Recorded by [SK] RACHEL Hussein      Cognitive Assessment/Intervention    Current Cognitive/Communication Assessment functional  -SK      Orientation Status oriented x 4  -SK      Follows Commands/Answers Questions 100% of the time  -SK      Personal Safety WNL/WFL  -SK      Personal Safety Interventions fall prevention program maintained  -SK      Recorded by [SK] RACHEL Hussein      Mobility Assessment/Training     Extremity Weight-Bearing Status left upper extremity  -SK      Left Upper Extremity Weight-Bearing non weight-bearing  -SK      Recorded by [SK] Meliza De La Cruz, OTR      Lower Body Bathing Assessment/Training    LB Bathing Assess/Train, Comment OT completed education on axillary care   -SK      Recorded by [SK] Meliza De La Cruz, OTR      Upper Body Dressing Assessment/Training    UB Dressing Assess/Train, Clothing Type doffing:;donning:   sling  -SK      UB Dressing Assess/Train, Assist Device alley technique  -SK      UB Dressing Assess/Train, Position supine  -SK      UB Dressing Assess/Train, [place] maximum assist (25% patient effort)  -SK      UB Dressing Assess/Train, Impairments ROM decreased;pain  -SK      UB Dressing Assess/Train, Comment reinforced education of donning/doffing sling and no AROM shoulder for assistance  -SK      Recorded by [SK] Meliza De La Cruz, OTR      Therapy Exercises    Left Upper Extremity PROM:;AAROM:;AROM:;10 reps;supine;elbow flexion/extension;hand pumps;pronation/supination;shoulder ER/IR;other reps   AROM digit, wrist, elbow F/E, pronation/supination...  -SK      LUE Other Reps --   AROM scap retractions, PROM IR to chest, ER to 30....  -SK      LUE Resistance --   FE to ~140 d/t pain tolerance  -SK      Bilateral Upper Extremity --   AAROM req. on elbow f/e to iniaite then progressed to arom  -SK      Recorded by [SK] Meliza De La Cruz, OTR      Positioning and Restraints    Pre-Treatment Position in bed  -SK      Post Treatment Position bed  -SK      In Bed supine;call light within reach;encouraged to call for assist;with nsg   L sling in place, pillow for support, ice to incision site  -SK      Recorded by [SK] Meliza De La Cruz, OTR        User Key  (r) = Recorded By, (t) = Taken By, (c) = Cosigned By    Initials Name Effective Dates    LUDWIN Churchill, PTA 06/22/15 -     SK Meliza De La Cruz, OTR 05/18/15 -     BEBETO Agrawal, OT 06/22/15 -     ST[facility] DILLON  [facility], PT 06/13/16 -                 IP PT Goals       02/17/17 0919 02/16/17 1116 02/14/17 1504    Bed Mobility PT LTG    Bed Mobility PT LTG, Date Established   02/14/17  -SC (r) LB (t) SC (c)    Bed Mobility PT LTG, Time to Achieve   5 days  -SC (r) LB (t) SC (c)    Bed Mobility PT LTG, Activity Type   all bed mobility  -SC (r) LB (t) SC (c)    Bed Mobility PT LTG, [place] Level   conditional independence  -SC (r) LB (t) SC (c)    Bed Mobility PT LTG, Outcome  goal met  -UD     Transfer Training PT LTG    Transfer Training PT LTG, Date Established   02/14/17  -SC (r) LB (t) SC (c)    Transfer Training PT LTG, Time to Achieve   5 days  -SC (r) LB (t) SC (c)    Transfer Training PT LTG, Activity Type   all transfers  -SC (r) LB (t) SC (c)    Transfer Training PT LTG, [place] Level   conditional independence  -SC (r) LB (t) SC (c)    Transfer Training PT LTG, Outcome  goal met  -UD     Gait Training PT LTG    Gait Training Goal PT LTG, Date Established   02/14/17  -SC (r) LB (t) SC (c)    Gait Training Goal PT LTG, Time to Achieve   5 days  -SC (r) LB (t) SC (c)    Gait Training Goal PT LTG, [place] Level   supervision required  -SC (r) LB (t) SC (c)    Gait Training Goal PT LTG, Assist Device   cane, straight  -SC (r) LB (t) SC (c)    Gait Training Goal PT LTG, Distance to Achieve   500 ft.  -SC (r) LB (t) SC (c)    Gait Training Goal PT LTG, Outcome goal ongoing  -BD goal ongoing  -UD     Stair Training PT LTG    Stair Training Goal PT LTG, Date Established   02/14/17  -SC (r) LB (t) SC (c)    Stair Training Goal PT LTG, Time to Achieve   5 days  -SC (r) LB (t) SC (c)    Stair Training Goal PT LTG, Number of Steps   10  -SC (r) LB (t) SC (c)    Stair Training Goal PT LTG, [place] Level   conditional independence  -SC (r) LB (t) SC (c)    Stair Training Goal PT LTG, Assist Device   1 handrail  -SC (r) LB (t) SC (c)    Stair Training Goal PT LTG, Outcome goal ongoing  -BD goal ongoing  -UD        User Key  (r) = Recorded By, (t) = Taken By, (c) = Cosigned By    Initials Name Provider Type    SC Susan Costa, PT Physical Therapist    LUDWIN Churchill, LUPE Physical Therapy Assistant     Carlene Petersen, PT Physical Therapist    KAYLAH Barbosa, PT Student PT Student          Physical Therapy Education     Title: PT OT SLP Therapies (Active)     Topic: Physical Therapy (Active)     Point: Mobility training (Active)    Learning Progress Summary    Learner Readiness Method Response Comment Documented by Status   Patient Acceptance E,D NR   02/17/17 0918 Active    Eager AMIE HANEY DU,NR   02/16/17 1116 Done    Acceptance E NR   02/14/17 1511 Active               Point: Home exercise program (Active)    Learning Progress Summary    Learner Readiness Method Response Comment Documented by Status   Patient Acceptance E,D NR   02/17/17 0918 Active    Eager AMIE HANEY DU,NR   02/16/17 1116 Done               Point: Body mechanics (Active)    Learning Progress Summary    Learner Readiness Method Response Comment Documented by Status   Patient Acceptance E,D NR   02/17/17 0918 Active    Eager AMIE HANEY DU,NR   02/16/17 1116 Done    Acceptance E NR   02/14/17 1511 Active               Point: Precautions (Active)    Learning Progress Summary    Learner Readiness Method Response Comment Documented by Status   Patient Acceptance E,D NR   02/17/17 0918 Active    Eager AMIE HANEY DU,NR   02/16/17 1116 Done    Acceptance E NR   02/14/17 1511 Active                      User Key     Initials Effective Dates Name Provider Type Discipline     06/22/15 -  Katy Churchill, LUPE Physical Therapy Assistant PT     06/13/16 -  Carlene Petersen, PT Physical Therapist PT     03/23/16 -  Berkley Barbosa, PT Student PT Student PT                    PT Recommendation and Plan  Anticipated Equipment Needs At Discharge: standard cane, bathing equipment, reacher (provided pt. with straight cane on 2/14/17)  Anticipated  Discharge Disposition: inpatient rehabilitation facility  Planned Therapy Interventions: bed mobility training, gait training, stair training, transfer training, strengthening, patient/family education  PT Frequency: daily  Plan of Care Review  Plan Of Care Reviewed With: patient  Progress: improving  Outcome Summary/Follow up Plan: Pt improvig from humeral fx and shoulder surgery. Pt ambulating with straight cane. Safety concerns for being mildly impulsive. LE Home Exercises reviewed with pt. Ambulation distance increasing, Supervision needed.          Outcome Measures       02/17/17 0825 02/16/17 1618 02/16/17 1010    How much help from another person do you currently need...    Turning from your back to your side while in flat bed without using bedrails? 4  -BD  4  -UD    Moving from lying on back to sitting on the side of a flat bed without bedrails? 4  -BD  4  -UD    Moving to and from a bed to a chair (including a wheelchair)? 3  -BD  3  -UD    Standing up from a chair using your arms (e.g., wheelchair, bedside chair)? 3  -BD  3  -UD    Climbing 3-5 steps with a railing? 2  -BD  2  -UD    To walk in hospital room? 3  -BD  3  -UD    AM-PAC 6 Clicks Score 19  -BD  19  -UD    How much help from another is currently needed...    Putting on and taking off regular lower body clothing?  2  -AR     Bathing (including washing, rinsing, and drying)  3  -AR     Toileting (which includes using toilet bed pan or urinal)  3  -AR     Putting on and taking off regular upper body clothing  2  -AR     Taking care of personal grooming (such as brushing teeth)  3  -AR     Eating meals  3  -AR     Score  16  -AR     Functional Assessment    Outcome Measure Options AM-PAC 6 Clicks Basic Mobility (PT)  -BD AM-PAC 6 Clicks Daily Activity (OT)  -AR       02/15/17 1130 02/14/17 1528 02/14/17 1330    How much help from another person do you currently need...    Turning from your back to your side while in flat bed without using  bedrails?   3  -SC (r) LB (t) SC (c)    Moving from lying on back to sitting on the side of a flat bed without bedrails?   3  -SC (r) LB (t) SC (c)    Moving to and from a bed to a chair (including a wheelchair)?   4  -SC (r) LB (t) SC (c)    Standing up from a chair using your arms (e.g., wheelchair, bedside chair)?   4  -SC (r) LB (t) SC (c)    Climbing 3-5 steps with a railing?   3  -SC (r) LB (t) SC (c)    To walk in hospital room?   3  -SC (r) LB (t) SC (c)    AM-PAC 6 Clicks Score   20  -SC (r) LB (t)    How much help from another is currently needed...    Putting on and taking off regular lower body clothing? 1  -SK 1  -AR     Bathing (including washing, rinsing, and drying) 2  -SK 2  -AR     Toileting (which includes using toilet bed pan or urinal) 3  -SK 3  -AR     Putting on and taking off regular upper body clothing 2  -SK 2  -AR     Taking care of personal grooming (such as brushing teeth) 3  -SK 3  -AR     Eating meals 3  -SK 3  -AR     Score 14  -SK 14  -AR     Functional Assessment    Outcome Measure Options  AM-PAC 6 Clicks Daily Activity (OT)  -AR AM-PAC 6 Clicks Basic Mobility (PT)  -SC (r) LB (t) SC (c)      User Key  (r) = Recorded By, (t) = Taken By, (c) = Cosigned By    Initials Name Provider Type    ASHLI Costa, PT Physical Therapist    LUDWIN Churchill, PTA Physical Therapy Assistant    MERARY De La Cruz, OTR Occupational Therapist    AR Marlena Agrawal, OT Occupational Therapist    STACY Petersen, PT Physical Therapist    KAYLAH Barbosa, PT Student PT Student           Time Calculation:         PT Charges       02/17/17 0924          Time Calculation    Start Time 0825  -BD      PT Received On 02/17/17  -BD      PT Goal Re-Cert Due Date 02/24/17  -      Time Calculation- PT    Total Timed Code Minutes- PT 38 minute(s)  -        User Key  (r) = Recorded By, (t) = Taken By, (c) = Cosigned By    Initials Name Provider Type    STACY Petersen, PT Physical Therapist           Therapy Charges for Today     Code Description Service Date Service Provider Modifiers Qty    41597699143 HC GAIT TRAINING EA 15 MIN 2/17/2017 Carlene Petersen, PT GP 1    67545501498 HC PT THER PROC EA 15 MIN 2/17/2017 Carlene Petersen, PT GP 2          PT G-Codes  Outcome Measure Options: AM-PAC 6 Clicks Basic Mobility (PT)    Carlene Petersen, PT  2/17/2017

## 2017-02-17 NOTE — PROGRESS NOTES
Orthopedic Daily Progress Note      CC: Better today    Pain controlled  General: no fevers, chills  Abdomen: no nausea, vomiting, or diarrhea    No other complaints    Physical Exam:  I have reviewed the vital signs.  Temp:  [97.9 °F (36.6 °C)] 97.9 °F (36.6 °C)  Heart Rate:  [] 94  Resp:  [16-18] 16  BP: ()/(61-67) 113/67    Objective  General Appearance:    Alert, cooperative, no distress  Extremities: No clubbing, cyanosis, or edema to lower extremities  Pulses:  2+ in distal surgical extremity  Skin: Dressing Clean/dry/intact      Results Review:    I have reviewed the labs, radiology results and diagnostic studies:yes      Results from last 7 days  Lab Units 02/17/17  0509   WBC 10*3/mm3 8.78   HEMOGLOBIN g/dL 9.0*   PLATELETS 10*3/mm3 427       Results from last 7 days  Lab Units 02/17/17  0509   SODIUM mmol/L 138   POTASSIUM mmol/L 4.1   TOTAL CO2 mmol/L 28.0   CREATININE mg/dL 0.50*   GLUCOSE mg/dL 110*     CT Scan with atelectasis, no PE    I have reviewed the medications.    Assessment/Problem List  POD# 4 S/p R reverse TSA for fracture    Plan  D/c to home with HH      Discharge Planning: I expect patient to be discharged to home today.    Gerson Carcamo MD  02/17/17  7:35 AM

## 2017-02-17 NOTE — THERAPY DISCHARGE NOTE
Acute Care - Occupational Therapy Treatment Note/Discharge   [place]     Patient Name: Kayleigh Thompson  : 1960  MRN: [id number]  Today's Date: 2017  Onset of Illness/Injury or Date of Surgery Date: 17  Date of Referral to OT: 17  Referring Physician: Dr. Carcamo      Admit Date: 2017    Visit Dx:     ICD-10-CM ICD-9-CM   1. Impaired functional mobility, balance, gait, and endurance Z74.09 V49.89   2. Impaired mobility and ADLs Z74.09 799.89   3. S/p reverse total shoulder arthroplasty, left Z96.612 V43.61     Patient Active Problem List   Diagnosis   • Closed 4-part fracture of proximal end of left humerus   • Tenosynovitis of left upper arm   • Sleep apnea   • GERD (gastroesophageal reflux disease)   • Bipolar disorder   • Arthritis   • Tobacco abuse   • Fever   • S/p left reverse total shoulder arthroplasty             Adult Rehabilitation Note       17 1315 17 0825 17 1618    Rehab Assessment/Intervention    Discipline occupational therapist  -SK physical therapist  -BD occupational therapist  -AR    Document Type therapy note (daily note);discharge summary  -SK therapy note (daily note)  -BD therapy note (daily note)   POD#3 left RTSA for proximal humerus FX  -AR    Subjective Information no complaints;agree to therapy  -SK agree to therapy;complains of;pain  -BD agree to therapy;complains of;pain  -AR    Patient Effort, Rehab Treatment excellent  -SK good  -BD good  -AR    Symptoms Noted During/After Treatment none  -SK none  -BD none  -AR    Precautions/Limitations non-weight bearing status   shoulder precautions per MD  -SK non-weight bearing status;shoulder precautions;other (see comments)   interscalene nerve catheter  -BD non-weight bearing status;shoulder precautions   interscalene nerve catheter  -AR    Specific Treatment Considerations pt d/c'ing home this date  -SK  Pt now anticiapting DC home with HH instead of rehab placement   -AR    Recorded by  [SK] Meliza De La Cruz, OTR [BD] Carlene Petersen, PT [AR] Marlena Agrawal, SALAZAR    Vital Signs    Pre Systolic BP Rehab  130  -BD     Pre Treatment Diastolic BP  78  -BD     Pre Patient Position  Sitting  -BD     Intra Patient Position  Standing  -BD     Post Patient Position  Sitting  -BD     Recorded by  [BD] Carlene Petersen, PT     Pain Assessment    Pain Assessment 0-10  -SK 0-10  -BD 0-10  -AR    Pain Score 3  -SK 5  -BD 6  -AR    Post Pain Score 7  -SK 5  -BD 3  -AR    Pain Type Acute pain  -SK Acute pain  -BD Acute pain  -AR    Pain Location Shoulder  -SK Shoulder  -BD Shoulder  -AR    Pain Orientation Left  -SK Left  -BD Left  -AR    Pain Intervention(s) Medication (See MAR)  -SK Medication (See MAR);Repositioned;Ambulation/increased activity  -BD Medication (See MAR);Repositioned;Ambulation/increased activity  -AR    Response to Interventions tolerated  -SK tolerated   -BD tolerated  -AR    Recorded by [SK] Meliza De La Cruz, OTR [BD] Carlene Petersen, PT [AR] Marlena Agrawal, SALAZAR    Cognitive Assessment/Intervention    Current Cognitive/Communication Assessment functional  -SK functional  -BD functional  -AR    Orientation Status oriented x 4  -SK oriented x 4  -BD oriented x 4  -AR    Follows Commands/Answers Questions 100% of the time  -% of the time;able to follow single-step instructions  -% of the time;able to follow single-step instructions  -AR    Personal Safety WNL/WFL  -SK WNL/WFL  -BD WNL/WFL  -AR    Personal Safety Interventions fall prevention program maintained;gait belt;nonskid shoes/slippers when out of bed  -SK fall prevention program maintained;gait belt;nonskid shoes/slippers when out of bed  -BD fall prevention program maintained  -AR    Recorded by [SK] Meliza De La Cruz, SALAZARR [BD] Carlene Petersen, PT [AR] Marlena Agrawal, SALAZAR    Mobility Assessment/Training    Extremity Weight-Bearing Status left upper extremity  -SK  left upper extremity  -AR    Left Upper  Extremity Weight-Bearing non weight-bearing  -SK  non weight-bearing  -AR    Recorded by [SK] Meliza De La Cruz, OTR  [AR] Marlena Agrawal, OT    Bed Mobility, Assessment/Treatment    Bed Mobility, Assistive Device head of bed elevated  -SK  head of bed elevated  -AR    Bed Mob, Supine to Sit, [place] conditional independence  -SK  conditional independence  -AR    Bed Mob, Sit to Supine, [place] conditional independence  -SK  conditional independence  -AR    Bed Mobility, Comment increased time to complete  -SK Pt sitting up on EOB prior to PT  -BD pt maintained NWB LUE with independence   -AR    Recorded by [SK] Meliza De La Cruz, OTR [BD] Carlene Petersen, PT [AR] Marlena Agrawal, OT    Transfer Assessment/Treatment    Transfers, Sit-Stand [place] supervision required  -SK supervision required  -BD supervision required  -AR    Transfers, Stand-Sit [place] supervision required  -SK supervision required;verbal cues required  -BD supervision required  -AR    Transfers, Sit-Stand-Sit, Assist Device straight cane  -SK straight cane  -BD straight cane  -AR    Toilet Transfer, [place]   supervision required  -AR    Toilet Transfer, Assistive Device   straight cane  -AR    Recorded by [SK] Meliza De La Cruz, OTR [BD] Carlene Petersen, PT [AR] Marlena Agrawal, OT    Gait Assessment/Treatment    Gait, [place] Level  contact guard assist;verbal cues required  -BD     Gait, Assistive Device  straight cane  -BD     Gait, Distance (Feet)  450  -BD     Gait, Gait Pattern Analysis  swing-through gait  -BD     Gait, Gait Deviations  forward flexed posture;narrow base  -BD     Gait, Safety Issues  balance decreased during turns  -BD     Gait, Impairments  strength decreased  -BD     Recorded by  [BD] Carlene Petersen, PT     Functional Mobility    Functional Mobility- Ind. Level supervision required  -SK contact guard assist  -BD contact guard assist  -AR    Functional Mobility- Device  straight cane  -SK straight cane  -BD straight cane  -AR    Recorded by [SK] Meliza De La Cruz, OTR [BD] Carlene Petersen, PT [AR] Marlena Agrawal, SALAZAR    Upper Body Bathing Assessment/Training    UB Bathing Assess/Train, Comment completed simulated axillary care/bathing of UE EOB per MD precautions  -SK  reviewed left axilla care  -AR    Recorded by [SK] Meliza De La Cruz OTR  [AR] Marlena Agrawal, ASLAZAR    Upper Body Dressing Assessment/Training    UB Dressing Assess/Train, Clothing Type doffing:;donning:;hospital gown;pull over  -SK  doffing:;donning:   sling  -AR    UB Dressing Assess/Train, Assist Device   alley technique  -AR    UB Dressing Assess/Train, Position sitting  -SK  supine  -AR    UB Dressing Assess/Train, [place] conditional independence  -SK  maximum assist (25% patient effort);verbal cues required  -AR    UB Dressing Assess/Train, Impairments ROM decreased  -SK  ROM decreased;pain   shoulder precautions  -AR    UB Dressing Assess/Train, Comment s/p education, pt able to complete donning shirt with conditional independence, min A for donning/doffing sling (completed X4 trials for competency; completed w/alley technique  -SK  Educated pt on shoulder precautions and maintainng shoulder precautions while completing ADLs. Educated pt on donning/doffing sling. Pt unable to complete by pulling sling overhead. Post-eduaction, pt able to verablize with min cues correct technique to don/doff sling. No family present for teaching. Educated pt on care of ON-Q ball during UB ADLs to avoid dislodgement.    -AR    Recorded by [SK] Meliza De La Cruz OTR  [AR] Marlena Agrawal, OT    Lower Body Dressing Assessment/Training    LB Dressing Assess/Train, Comment donned pants EOB alley technique with SUA s/p education  -SK      Recorded by [SK] Meliza De La Cruz OTR      Toileting Assessment/Training    Toileting Assess/Train, Assistive Device   grab bars  -AR    Toileting Assess/Train, Position    standing;sitting  -AR    Toileting Assess/Train, Indepen Level   supervision required   clothing management and post-toilet hygiene  -AR    Recorded by   [AR] Marlena Agrawal OT    Grooming Assessment/Training    Grooming Assess/Train, Position   standing  -AR    Grooming Assess/Train, Indepen Level   supervision required  -AR    Recorded by   [AR] Marlena Agrawal OT    Balance Skills Training    Sitting-Level of Assistance Independent  -SK      Standing-Level of Assistance Close supervision  -SK      Static Standing Balance Support assistive device  -SK      Gait Balance-Level of Assistance Contact guard  -SK      Gait Balance Support assistive device  -SK      Recorded by [SK] Meliza De La Cruz, SALAZARR      Therapy Exercises    Bilateral Lower Extremities  10 reps;sitting;ankle pumps/circles;quad sets  -     Left Upper Extremity AROM:;10 reps;supine;elbow flexion/extension;hand pumps;pronation/supination;shoulder protraction/retraction;PROM:;shoulder extension/flexion;shoulder ER/IR   Tolerated PROM , IR chest/ER 20; no family present   -SK  AROM:;10 reps;supine;elbow flexion/extension;hand pumps;pronation/supination;shoulder protraction/retraction;PROM:;shoulder extension/flexion;shoulder ER/IR   Tolerated PROM , IR chest/ER 20; no family present   -AR    LUE Other Reps --   pt requested 2nd set of AROM exercises in which she recalled  -SK      LUE Resistance --   fully on her own  -SK      Exercise Protocols   --   Educated pt on SROM, she completed with vc and min assist  -AR    Recorded by [SK] Meliza De La Cruz, OTR [BD] Carlene Petersen, PT [AR] Marlena Agrawal, OT    Positioning and Restraints    Pre-Treatment Position in bed  -SK other (comment)  -BD in bed  -AR    Post Treatment Position bed  -SK chair   Sitting on EOB  -BD bed  -AR    In Bed sitting EOB;call light within reach;encouraged to call for assist;with family/caregiver  -SK  supine;call light within reach;encouraged to call  for assist;exit alarm on;with brace  -AR    In Chair  notified nsg;reclined;call light within reach;encouraged to call for assist;legs elevated  -BD     Recorded by [SK] Meliza De La Cruz OTR [BD] Carlene Petersen, PT [AR] Marlena Arleen Tanja, OT      02/16/17 1010 02/15/17 1130       Rehab Assessment/Intervention    Discipline physical therapy assistant  -UD occupational therapist  -SK     Document Type therapy note (daily note)  -UD therapy note (daily note)  -SK     Subjective Information agree to therapy;complains of;pain  -UD no complaints;agree to therapy  -SK     Patient Effort, Rehab Treatment good  -UD good  -SK     Symptoms Noted During/After Treatment increased pain  -UD increased pain  -SK     Symptoms Noted Comment  pt was pre-medicated  -SK     Precautions/Limitations  non-weight bearing status   interscalene nerve cath L UE; ROM per MD protocol  -SK     Recorded by [UD] Katy Churchill PTA [SK] Meliza De La Cruz, SALAZARR     Vital Signs    Pre Patient Position Supine  -UD      Intra Patient Position Standing  -UD      Post Patient Position Supine  -UD      Recorded by [UD] Katy Churchill PTA      Pain Assessment    Pain Assessment  0-10  -SK     Pain Score  3  -SK     Post Pain Score  6  -SK     Pain Type  Acute pain;Surgical pain  -SK     Pain Location  Shoulder  -SK     Pain Orientation  Left  -SK     Pain Intervention(s)  --   pre-medicated; nerve cath  -SK     Response to Interventions  tolerated  -SK     Recorded by  [SK] Meliza De La Cruz OTR     Pain 2    Pain Score 2 3  -UD      Pre Tx Pain Score 2 4  -UD      Post Tx Pain Score 2 4  -UD      Pain Type 2 Acute pain  -UD      Pain Location 2 Shoulder  -UD      Pain Orientation 2 Left  -UD      Pain Intervention(s) 2 Repositioned  -UD      Recorded by [UD] Katy Churchill PTA      Cognitive Assessment/Intervention    Current Cognitive/Communication Assessment  functional  -SK     Orientation Status  oriented x 4  -SK     Follows Commands/Answers  Questions  100% of the time  -SK     Personal Safety  WNL/WFL  -SK     Personal Safety Interventions  fall prevention program maintained  -SK     Recorded by  [SK] Meliza De La Cruz, SALAZARR     Mobility Assessment/Training    Extremity Weight-Bearing Status  left upper extremity  -SK     Left Upper Extremity Weight-Bearing  non weight-bearing  -SK     Recorded by  [SK] Meliza De La Cruz, SALAZARR     Bed Mobility, Assessment/Treatment    Bed Mob, Supine to Sit, [place] independent  -UD      Bed Mob, Sit to Supine, [place] independent  -UD      Recorded by [UD] Katy Churchill PTA      Transfer Assessment/Treatment    Transfers, Sit-Stand [place] stand by assist  -UD      Transfers, Stand-Sit [place] stand by assist  -UD      Transfers, Sit-Stand-Sit, Assist Device straight cane  -UD      Recorded by [UD] Katy Churchill PTA      Gait Assessment/Treatment    Gait, [place] Level contact guard assist  -UD      Gait, Assistive Device straight cane  -UD      Gait, Distance (Feet) 400  -UD      Gait, Gait Deviations ataxia  -UD      Gait, Safety Issues balance decreased during turns  -UD      Gait, Impairments strength decreased  -UD      Recorded by [UD] Katy Churchill PTA      Lower Body Bathing Assessment/Training    LB Bathing Assess/Train, Comment  OT completed education on axillary care   -SK     Recorded by  [SK] Meliza De La Cruz, OTR     Upper Body Dressing Assessment/Training    UB Dressing Assess/Train, Clothing Type  doffing:;donning:   sling  -SK     UB Dressing Assess/Train, Assist Device  alley technique  -SK     UB Dressing Assess/Train, Position  supine  -SK     UB Dressing Assess/Train, [place]  maximum assist (25% patient effort)  -SK     UB Dressing Assess/Train, Impairments  ROM decreased;pain  -SK     UB Dressing Assess/Train, Comment  reinforced education of donning/doffing sling and no AROM shoulder for assistance  -SK     Recorded by  [SK] eMliza De La Cruz, SALAZARR     Therapy Exercises     Left Upper Extremity  PROM:;AAROM:;AROM:;10 reps;supine;elbow flexion/extension;hand pumps;pronation/supination;shoulder ER/IR;other reps   AROM digit, wrist, elbow F/E, pronation/supination...  -SK     LUE Other Reps  --   AROM scap retractions, PROM IR to chest, ER to 30....  -SK     LUE Resistance  --   FE to ~140 d/t pain tolerance  -SK     Bilateral Upper Extremity  --   AAROM req. on elbow f/e to iniaite then progressed to arom  -SK     Recorded by  [SK] Meliza De La Cruz, OTR     Positioning and Restraints    Pre-Treatment Position in bed  -UD in bed  -SK     Post Treatment Position bed  -UD bed  -SK     In Bed notified nsg;supine;call light within reach;side rails up x2  -UD supine;call light within reach;encouraged to call for assist;with nsg   L sling in place, pillow for support, ice to incision site  -SK     Recorded by [UD] Katy Churchill, PTA [SK] Meliza De La Cruz, OTR       User Key  (r) = Recorded By, (t) = Taken By, (c) = Cosigned By    Initials Name Effective Dates    UD Katy Churchill, PTA 06/22/15 -     SK Meliza De La Cruz, OTR 05/18/15 -     AR Marlena Agrawal, OT 06/22/15 -     BD Carlene Petersen, PT 06/13/16 -                 OT Goals       02/17/17 1315 02/16/17 1727 02/15/17 1130    Transfer Training OT LTG    Transfer Training OT LTG, Outcome goal met  -SK goal met  -AR goal ongoing  -SK    Range of Motion OT LTG    Range of Motion Goal OT LTG, Outcome goal met  -SK goal ongoing  -AR goal ongoing   pt progressing  -SK    Patient Education OT LTG    Patient Education OT LTG Outcome goal met  -SK goal ongoing  -AR goal ongoing   progressing  -SK    UB Dressing OT LTG    UB Dressing Goal OT LTG, Outcome goal met  -SK goal ongoing  -AR goal ongoing  -SK      02/14/17 1712          Transfer Training OT LTG    Transfer Training OT LTG, Date Established 02/14/17  -AR      Transfer Training OT LTG, Time to Achieve 1 wk  -AR      Transfer Training OT LTG, Activity Type sit to stand/stand to  sit;toilet  -AR      Transfer Training OT LTG, [place] Level supervision required;verbal cues required  -AR      Transfer Training OT LTG, Assist Device cane, straight  -AR      Range of Motion OT LTG    Range of Motion Goal OT LTG, Date Established 02/14/17  -AR      Range of Motion Goal OT LTG, Time to Achieve 1 wk  -AR      Range of Motion Goal OT LTG, AROM Measure Pt will particiapte in LUE HEP within physician parameters daily to support ADL function   -AR      Patient Education OT LTG    Patient Education OT LTG, Date Established 02/14/17  -AR      Patient Education OT LTG, Time to Achieve 1 wk  -AR      Patient Education OT LTG, Education Type written program;HEP;precautions per surgeon;1 hand/alley technique  -AR      Patient Education OT LTG, Education Understanding demonstrates adequately;verbalizes understanding  -AR      UB Dressing OT LTG    UB Dressing Goal OT LTG, Date Established 02/14/17  -AR      UB Dressing Goal OT LTG, Time to Achieve 1 wk  -AR      UB Dressing Goal OT LTG, Activity Type Pt will don/doff LUE sling  -AR      UB Dressing Goal OT LTG, [place] Level verbal cues required;moderate assist (50% patient effort)  -AR        User Key  (r) = Recorded By, (t) = Taken By, (c) = Cosigned By    Initials Name Provider Type    SK Meliza De La Cruz OTR Occupational Therapist    BEBETO Agrawal, OT Occupational Therapist          Occupational Therapy Education     Title: PT OT SLP Therapies (Done)     Topic: Occupational Therapy (Done)     Point: ADL training (Done)    Description: Instruct learner(s) on proper safety adaptation and remediation techniques during self care or transfers.   Instruct in proper use of assistive devices.    Learning Progress Summary    Learner Readiness Method Response Comment Documented by Status   Patient Acceptance E,TB,D,H ZULEIKA,GONZALES pt with excellent teach back on alley technique, dressing & sling use SK 02/17/17 1508 Done    Acceptance E,D NR  BD 02/17/17  0918 Active    CYRUS Moseley D NR,VU reviewed shoudler precautions, ADL retraining to maintain, care of ON-Q ball with ADLS, transfer training, SROM and LUE HEP AR 02/16/17 1726 Done    Acceptance LYNDON BRIDGES D VU, DU  SK 02/15/17 1337 Done    EagCYRUS Maddox D,H ZULEIKA,GONZALES Reviewed left shoulder precautions, ADL retraining to maintain, care of ON-Q ball during ADL activities, LUE HEP, NWB LUE, bed mobility, transfer training, DC recommendation of rehab AR 02/14/17 1710 Done               Point: Home exercise program (Done)    Description: Instruct learner(s) on appropriate technique for monitoring, assisting and/or progressing therapeutic exercises/activities.    Learning Progress Summary    Learner Readiness Method Response Comment Documented by Status   Patient Acceptance E,TB,D,H ZULEIKA,GONZALES pt with excellent teach back on alley technique, dressing & sling use SK 02/17/17 1508 Done    Acceptance E,D NR   02/17/17 0918 Active    CYRUS Moseley D NR,VU reviewed shoudler precautions, ADL retraining to maintain, care of ON-Q ball with ADLS, transfer training, SROM and LUE HEP AR 02/16/17 1726 Done    Acceptance LYNDON BRIDGES D VU, DU  SK 02/15/17 1337 Done    CYRUS Moseley D,H ZULEIKA,GONZALES Reviewed left shoulder precautions, ADL retraining to maintain, care of ON-Q ball during ADL activities, LUE HEP, NWB LUE, bed mobility, transfer training, DC recommendation of rehab AR 02/14/17 1710 Done               Point: Precautions (Done)    Description: Instruct learner(s) on prescribed precautions during self-care and functional transfers.    Learning Progress Summary    Learner Readiness Method Response Comment Documented by Status   Patient Acceptance E,TB,D,H VU,DU pt with excellent teach back on alley technique, dressing & sling use SK 02/17/17 1508 Done    Acceptance E,D NR   02/17/17 0918 Active    Prettyer LYNDONTBD NR,VU reviewed shoudler precautions, ADL retraining to maintain, care of ON-Q ball with ADLS, transfer training, SROM and LUE HEP AR 02/16/17 1726 Done     CYRUS Moseley D,H GONZALES TO Reviewed left shoulder precautions, ADL retraining to maintain, care of ON-Q ball during ADL activities, LUE HEP, NWB LUE, bed mobility, transfer training, DC recommendation of rehab AR 02/14/17 1710 Done               Point: Body mechanics (Done)    Description: Instruct learner(s) on proper positioning and spine alignment during self-care, functional mobility activities and/or exercises.    Learning Progress Summary    Learner Readiness Method Response Comment Documented by Status   Patient Acceptance LYNDON,AMIE BRIDGES,H GONZALES TO pt with excellent teach back on alley technique, dressing & sling use SK 02/17/17 1508 Done    Acceptance E,D NR   02/17/17 0918 Active    CYRUS Moseley D NR,ZULEIKA reviewed shoudler precautions, ADL retraining to maintain, care of ON-Q ball with ADLS, transfer training, SROM and LUE HEP AR 02/16/17 1726 Done    CYRUS Moseley D,H GONZALES TO Reviewed left shoulder precautions, ADL retraining to maintain, care of ON-Q ball during ADL activities, LUE HEP, NWB LUE, bed mobility, transfer training, DC recommendation of rehab AR 02/14/17 1710 Done                      User Key     Initials Effective Dates Name Provider Type Discipline    SK 05/18/15 -  Meliza De La Cruz OTR Occupational Therapist OT    AR 06/22/15 -  Marlena Agrawal, OT Occupational Therapist OT     06/13/16 -  Carlene Petersen, PT Physical Therapist PT                OT Recommendation and Plan  Anticipated Discharge Disposition: home with assist, home with home health  Therapy Frequency: daily (per priority policy)  Plan of Care Review  Plan Of Care Reviewed With: patient  Outcome Summary/Follow up Plan: pt with excellent teach back and ability to complete alley dressing; d/c'ing home this date with HH and assist of her mother                                                                 Outcome Measures       02/17/17 1315 02/17/17 0825 02/16/17 1618    How much help from another person do you currently need...    Turning  from your back to your side while in flat bed without using bedrails?  4  -BD     Moving from lying on back to sitting on the side of a flat bed without bedrails?  4  -BD     Moving to and from a bed to a chair (including a wheelchair)?  3  -BD     Standing up from a chair using your arms (e.g., wheelchair, bedside chair)?  3  -BD     Climbing 3-5 steps with a railing?  2  -BD     To walk in hospital room?  3  -BD     AM-PAC 6 Clicks Score  19  -BD     How much help from another is currently needed...    Putting on and taking off regular lower body clothing? 3  -SK  2  -AR    Bathing (including washing, rinsing, and drying) 3  -SK  3  -AR    Toileting (which includes using toilet bed pan or urinal) 3  -SK  3  -AR    Putting on and taking off regular upper body clothing 3  -SK  2  -AR    Taking care of personal grooming (such as brushing teeth) 3  -SK  3  -AR    Eating meals 4  -SK  3  -AR    Score 19  -SK  16  -AR    Functional Assessment    Outcome Measure Options  AM-PAC 6 Clicks Basic Mobility (PT)  -BD AM-PAC 6 Clicks Daily Activity (OT)  -AR      02/16/17 1010 02/15/17 1130 02/14/17 1528    How much help from another person do you currently need...    Turning from your back to your side while in flat bed without using bedrails? 4  -UD      Moving from lying on back to sitting on the side of a flat bed without bedrails? 4  -UD      Moving to and from a bed to a chair (including a wheelchair)? 3  -UD      Standing up from a chair using your arms (e.g., wheelchair, bedside chair)? 3  -UD      Climbing 3-5 steps with a railing? 2  -UD      To walk in hospital room? 3  -UD      AM-PAC 6 Clicks Score 19  -UD      How much help from another is currently needed...    Putting on and taking off regular lower body clothing?  1  -SK 1  -AR    Bathing (including washing, rinsing, and drying)  2  -SK 2  -AR    Toileting (which includes using toilet bed pan or urinal)  3  -SK 3  -AR    Putting on and taking off regular upper  body clothing  2  -SK 2  -AR    Taking care of personal grooming (such as brushing teeth)  3  -SK 3  -AR    Eating meals  3  -SK 3  -AR    Score  14  -SK 14  -AR    Functional Assessment    Outcome Measure Options   AM-PAC 6 Clicks Daily Activity (OT)  -AR      User Key  (r) = Recorded By, (t) = Taken By, (c) = Cosigned By    Initials Name Provider Type    LUDWIN Churchill, PTA Physical Therapy Assistant    MERARY De La Cruz, OTR Occupational Therapist    AR Marlena Agrawal, OT Occupational Therapist    STACY Petersen, PT Physical Therapist           Time Calculation:          Time Calculation- OT       02/17/17 1514          Time Calculation- OT    OT Start Time 1315  -SK      Total Timed Code Minutes- OT 54 minute(s)  -SK      OT Received On 02/17/17  -SK      OT Goal Re-Cert Due Date 02/24/17  -SK        User Key  (r) = Recorded By, (t) = Taken By, (c) = Cosigned By    Initials Name Provider Type    SK Meliza De La Cruz, OTR Occupational Therapist          Therapy Charges for Today     Code Description Service Date Service Provider Modifiers Qty    [id number]  OT THERAPEUTIC ACT EA 15 MIN 2/17/2017 Meliza De La Cruz OTR GO 4               OT Discharge Summary  Anticipated Discharge Disposition: home with assist, home with home health  Reason for Discharge: All goals achieved  Discharge Destination: Home, Home with assist, Home with home health    RACHEL Villalba  2/17/2017

## 2017-02-17 NOTE — DISCHARGE SUMMARY
[facility] Medicine Services  DISCHARGE SUMMARY       Date of Admission: 2/13/2017  Date of Discharge:  2/17/2017  Primary Care Physician: No Known Provider    Discharge Diagnoses:  Active Hospital Problems (** Indicates Principal Problem)    Diagnosis Date Noted   • **S/p left reverse total shoulder arthroplasty [Z96.619] 02/14/2017   • Fever [R50.9] 02/14/2017   • Closed 4-part fracture of proximal end of left humerus [S42.202A] 02/13/2017   • Tenosynovitis of left upper arm [M65.9] 02/13/2017   • Sleep apnea [G47.30] 02/13/2017   • GERD (gastroesophageal reflux disease) [K21.9] 02/13/2017   • Bipolar disorder [F31.9] 02/13/2017   • Arthritis [M19.90] 02/13/2017   • Tobacco abuse [Z72.0] 02/13/2017      Resolved Hospital Problems    Diagnosis Date Noted Date Resolved   No resolved problems to display.       Presenting Problem/History of Present Illness  Closed 4-part fracture of proximal end of left humerus, initial encounter [S42.202A]      History of Present Illness on Day of Discharge:   Pt sitting up in bed resting, no family present at time of visit. Reports doing well, No new complaints. Denies fever, chills, SOA, new cough, CP, palpitations or other associated sx. Reports tolerating po well, no constipation. Pt states she is ready to go home today with family with HH services. She understands she is to continue using Incentive spirometry regularly, hourly when awake and wear O2 NC when taking naps/sleeping until f/u with PCP for sleep study.     Hospital Course  Patient is a 56 y.o. female with PMH :Bipolar do, GERD and recent fall with left humeral fracture was admitted for left reverse TSA performed by Dr Carcamo 2/13/17. Hospitalist service was consulted for medical management. Pt has been working well with PT/OT services. Unfortunately, she is not able to go to acute rehab due to issues with insurance approval and bed availability per . Pt and family have discussed options  with pt and team and plan to go home with Home health services including PT/OT. During hospital stay patient had episode of hypoxia, desat to 86% while sleeping, CTA of chest was ordered and negative to PE, showed bilateral lower lobe atelectasis. Pt has been encouraged to use incentive spirometry regularly. It is suspected she may have Sleep apnea, she tolerated CPAP overnight well without desats. Plan to dc home with supplemental 2L NC O2 when sleeping/HS. Pt is to follow up with PCP within 1 wk for post hospitalization eval and help arrange sleep study as outpatient to r/o Sleep apnea. Pt is to f/u with Dr. Carcamo outpt 2/23/17 at 1: 10pm . Pt is afebrile, satting in low 90's on room air when awake, wbc normal. She is medically appropriate for dc home with family. CM has arranged HH services/O2.       Procedures Performed  Procedure(s):  LEFT REVERSE TOTAL SHOULDER ARTHROPLASTY FOR PROXIMAL HUMERUS FRACTURE       Consults:   Consults     Date and Time Order Name Status Description    2/13/2017 1833 Inpatient Consult to Hospitalist Completed           Pertinent Test Results:  Imaging Results (most recent)     Procedure Component Value Units Date/Time    XR Shoulder 2+ View Left [[id number]] Collected:  02/14/17 1212     Updated:  02/14/17 1522    Narrative:       EXAMINATION: XR SHOULDER 2+ VW LEFT-      INDICATION: Postop.      COMPARISON: None.     FINDINGS: A left reverse shoulder replacement has been performed.  Hardware is well positioned. The alignment is excellent and the native  bone structures are intact.           Impression:       1. Status post reverse left shoulder replacement.  2. There is mild fragmentation of the upper left humerus. Overall  alignment is excellent and the bone integrity is intact.     D:  02/14/2017  E:  02/14/2017     This report was finalized on 2/14/2017 3:20 PM by Dr. Emerson Low MD.       XR Chest PA & Lateral [[id number]] Collected:  02/15/17 1013     Updated:  02/15/17 1043     Narrative:       EXAMINATION: XR CHEST PA AND LATERAL- 02/14/2017     INDICATION: Breath sounds, cough and fever; Z74.09-Other reduced  mobility      COMPARISON: 02/10/2017     FINDINGS: PA and lateral views of the chest reveal the heart to be  enlarged. Elevation identified of the left hemidiaphragm with mild  increased markings at the left lung base. Degenerative changes seen  within the spine. Chronic changes seen within the lung fields  bilaterally. Small left pleural effusion.       Impression:       Elevation of the left hemidiaphragm with mild increased  markings at the left lung base. Degenerative changes seen within the  spine.     D:  02/15/2017  E:  02/15/2017     This report was finalized on 2/15/2017 10:41 AM by Dr. Kelly Crockett MD.       CT Angiogram Chest With & Without Contrast [[id number]] Collected:  02/16/17 1558     Updated:  02/16/17 1614    Narrative:       EXAMINATION: CT ANGIOGRAM CHEST W WO CONTRAST-      INDICATION: persistent mild hypoxia, post surgery, look for PE;  Z74.09-Other reduced mobility; Z74.09-Other reduced mobility;  Z96.612-Presence of left artificial shoulder joint         TECHNIQUE: Spiral acquisition 3 mm post-IV contrast images of the chest  with coronal 10 mm 2-D reconstructions.     The radiation dose reduction device was turned on for each scan per the  ALARA (As Low as Reasonably Achievable) protocol.     COMPARISON: NONE     FINDINGS: Patient history indicates persistent mild hypoxia following  recent surgery.     Note is made of recent postoperative changes of left shoulder  arthroplasty. There is good contrast opacification of the pulmonary  arteries. No filling defects are seen to suggest pulmonary embolic  disease. There is no evidence of thoracic aortic aneurysm or dissection,  pericardial or pleural effusion, or significant mediastinal adenopathy.     Lung window images show the upper lungs to appear grossly clear. There  is moderate discoid atelectasis  of the left lower lobe and milder right  lower lobe and lingular discoid atelectasis.     Included images of the upper abdomen show no significant abnormalities  of the visualized portions of the liver, spleen, gallbladder, pancreatic  tail, adrenal glands, and upper renal poles.             Impression:       1. No evidence of pulmonary embolus.  2. Moderate left lower lobe atelectasis, and milder lingular and right  lower lobe atelectasis. No evidence of pleural effusion.  3. Recent left shoulder arthroplasty.  4. Large hiatal hernia        This report was finalized on 2/16/2017 4:12 PM by DR. Derek Weinberg MD.           Lab Results (most recent)     Procedure Component Value Units Date/Time    CBC & Differential [[id number]] Collected:  02/14/17 0438    Specimen:  Blood Updated:  02/14/17 0529    Narrative:       The following orders were created for panel order CBC & Differential.  Procedure                               Abnormality         Status                     ---------                               -----------         ------                     CBC Auto Differential[[id number]]         Abnormal            Final result                 Please view results for these tests on the individual orders.    CBC Auto Differential [[id number]]  (Abnormal) Collected:  02/14/17 0438    Specimen:  Blood Updated:  02/14/17 0529     WBC 12.80 (H) 10*3/mm3      RBC 3.20 (L) 10*6/mm3      Hemoglobin 10.0 (L) g/dL      Hematocrit 31.0 (L) %      MCV 96.9 fL      MCH 31.3 (H) pg      MCHC 32.3 g/dL      RDW 14.9 (H) %      RDW-SD 52.9 fl      MPV 8.4 fL      Platelets 397 10*3/mm3      Neutrophil % 76.7 (H) %      Lymphocyte % 14.4 (L) %      Monocyte % 8.5 %      Eosinophil % 0.1 %      Basophil % 0.1 %      Immature Grans % 0.2 %      Neutrophils, Absolute 9.82 (H) 10*3/mm3      Lymphocytes, Absolute 1.84 10*3/mm3      Monocytes, Absolute 1.09 (H) 10*3/mm3      Eosinophils, Absolute 0.01 (L) 10*3/mm3      Basophils, Absolute 0.01  10*3/mm3      Immature Grans, Absolute 0.03 10*3/mm3     Basic Metabolic Panel [[id number]]  (Abnormal) Collected:  02/14/17 0438    Specimen:  Blood Updated:  02/14/17 0551     Glucose 117 (H) mg/dL      BUN 16 mg/dL      Creatinine 0.50 (L) mg/dL      Sodium 137 mmol/L      Potassium 3.5 mmol/L      Chloride 103 mmol/L      CO2 28.0 mmol/L      Calcium 9.6 mg/dL      eGFR Non African Amer 127 mL/min/1.73      BUN/Creatinine Ratio 32.0 (H)      Anion Gap 6.0 mmol/L     Narrative:       National Kidney [employer] Guidelines    Stage                           Description                             GFR                      1                               Normal or High                          90+  2                               Mild decrease                            60-89  3                               Moderate decrease                   30-59  4                               Severe decrease                       15-29  5                               Kidney failure                             <15    Urinalysis With / Culture If Indicated [[id number]]  (Normal) Collected:  02/14/17 1814    Specimen:  Urine from Urine, Clean Catch Updated:  02/14/17 1911     Color, UA Yellow      Appearance, UA Clear      pH, UA 5.5      Specific Gravity, UA <=1.005      Glucose, UA Negative      Ketones, UA Negative      Bilirubin, UA Negative      Blood, UA Negative      Protein, UA Negative      Leuk Esterase, UA Negative      Nitrite, UA Negative      Urobilinogen, UA 0.2 E.U./dL     Narrative:       Urine microscopic not indicated.    Potassium [[id number]]  (Normal) Collected:  02/14/17 2343    Specimen:  Blood Updated:  02/15/17 0015     Potassium 3.9 mmol/L     CBC (No Diff) [[id number]]  (Abnormal) Collected:  02/15/17 0502    Specimen:  Blood Updated:  02/15/17 0527     WBC 11.40 (H) 10*3/mm3      RBC 3.20 (L) 10*6/mm3      Hemoglobin 9.8 (L) g/dL      Hematocrit 31.4 (L) %      MCV 98.1 fL      MCH 30.6 pg      MCHC 31.2 (L)  g/dL      RDW 14.9 (H) %      RDW-SD 53.3 fl      MPV 8.3 fL      Platelets 368 10*3/mm3     Basic Metabolic Panel [[id number]]  (Abnormal) Collected:  02/15/17 0502    Specimen:  Blood Updated:  02/15/17 0543     Glucose 110 (H) mg/dL      BUN 8 (L) mg/dL      Creatinine 0.50 (L) mg/dL      Sodium 136 mmol/L      Potassium 4.0 mmol/L      Chloride 105 mmol/L      CO2 29.0 mmol/L      Calcium 9.4 mg/dL      eGFR Non African Amer 127 mL/min/1.73      BUN/Creatinine Ratio 16.0      Anion Gap 2.0 (L) mmol/L     Narrative:       National Kidney Foundation Guidelines    Stage                           Description                             GFR                      1                               Normal or High                          90+  2                               Mild decrease                            60-89  3                               Moderate decrease                   30-59  4                               Severe decrease                       15-29  5                               Kidney failure                             <15    CBC & Differential [[id number]] Collected:  02/16/17 0457    Specimen:  Blood Updated:  02/16/17 0647    Narrative:       The following orders were created for panel order CBC & Differential.  Procedure                               Abnormality         Status                     ---------                               -----------         ------                     CBC Auto Differential[[id number]]         Abnormal            Final result                 Please view results for these tests on the individual orders.    CBC Auto Differential [[id number]]  (Abnormal) Collected:  02/16/17 0457    Specimen:  Blood Updated:  02/16/17 0647     WBC 11.16 (H) 10*3/mm3      RBC 3.17 (L) 10*6/mm3      Hemoglobin 9.7 (L) g/dL      Hematocrit 31.4 (L) %      MCV 99.1 (H) fL      MCH 30.6 pg      MCHC 30.9 (L) g/dL      RDW 14.8 (H) %      RDW-SD 54.1 (H) fl      MPV 8.4 fL      Platelets 410  10*3/mm3      Neutrophil % 65.0 %      Lymphocyte % 23.8 (L) %      Monocyte % 8.4 %      Eosinophil % 2.2 %      Basophil % 0.2 %      Immature Grans % 0.4 %      Neutrophils, Absolute 7.26 10*3/mm3      Lymphocytes, Absolute 2.66 10*3/mm3      Monocytes, Absolute 0.94 10*3/mm3      Eosinophils, Absolute 0.24 10*3/mm3      Basophils, Absolute 0.02 10*3/mm3      Immature Grans, Absolute 0.04 (H) 10*3/mm3     Basic Metabolic Panel [[id number]]  (Abnormal) Collected:  02/16/17 0457    Specimen:  Blood Updated:  02/16/17 0704     Glucose 110 (H) mg/dL      BUN 9 mg/dL      Creatinine 0.60 mg/dL      Sodium 138 mmol/L      Potassium 3.8 mmol/L      Chloride 104 mmol/L      CO2 31.0 mmol/L      Calcium 9.1 mg/dL      eGFR Non African Amer 103 mL/min/1.73      BUN/Creatinine Ratio 15.0      Anion Gap 3.0 mmol/L     Narrative:       National Kidney Foundation Guidelines    Stage                           Description                             GFR                      1                               Normal or High                          90+  2                               Mild decrease                            60-89  3                               Moderate decrease                   30-59  4                               Severe decrease                       15-29  5                               Kidney failure                             <15    Magnesium [[id number]]  (Normal) Collected:  02/16/17 0457    Specimen:  Blood Updated:  02/16/17 0704     Magnesium 2.2 mg/dL     CBC & Differential [[id number]] Collected:  02/17/17 0509    Specimen:  Blood Updated:  02/17/17 0638    Narrative:       The following orders were created for panel order CBC & Differential.  Procedure                               Abnormality         Status                     ---------                               -----------         ------                     CBC Auto Differential[[id number]]         Abnormal            Final result                  Please view results for these tests on the individual orders.    CBC Auto Differential [[id number]]  (Abnormal) Collected:  02/17/17 0509    Specimen:  Blood Updated:  02/17/17 0638     WBC 8.78 10*3/mm3      RBC 3.03 (L) 10*6/mm3      Hemoglobin 9.0 (L) g/dL      Hematocrit 30.0 (L) %      MCV 99.0 fL      MCH 29.7 pg      MCHC 30.0 (L) g/dL      RDW 14.9 (H) %      RDW-SD 53.4 fl      MPV 8.5 fL      Platelets 427 10*3/mm3      Neutrophil % 64.8 %      Lymphocyte % 24.5 %      Monocyte % 7.6 %      Eosinophil % 2.6 %      Basophil % 0.2 %      Immature Grans % 0.3 %      Neutrophils, Absolute 5.68 10*3/mm3      Lymphocytes, Absolute 2.15 10*3/mm3      Monocytes, Absolute 0.67 10*3/mm3      Eosinophils, Absolute 0.23 10*3/mm3      Basophils, Absolute 0.02 10*3/mm3      Immature Grans, Absolute 0.03 10*3/mm3     Basic Metabolic Panel [[id number]]  (Abnormal) Collected:  02/17/17 0509    Specimen:  Blood Updated:  02/17/17 0710     Glucose 110 (H) mg/dL      BUN 9 mg/dL      Creatinine 0.50 (L) mg/dL      Sodium 138 mmol/L      Potassium 4.1 mmol/L      Chloride 107 mmol/L      CO2 28.0 mmol/L      Calcium 9.0 mg/dL      eGFR Non African Amer 127 mL/min/1.73      BUN/Creatinine Ratio 18.0      Anion Gap 3.0 mmol/L     Narrative:       National Kidney Foundation Guidelines    Stage                           Description                             GFR                      1                               Normal or High                          90+  2                               Mild decrease                            60-89  3                               Moderate decrease                   30-59  4                               Severe decrease                       15-29  5                               Kidney failure                             <15    Magnesium [[id number]]  (Normal) Collected:  02/17/17 0509    Specimen:  Blood Updated:  02/17/17 0710     Magnesium 2.3 mg/dL         Condition on Discharge:   "stable    Physical Exam on Discharge:  Visit Vitals   • /61   • Pulse 106   • Temp 97.6 °F (36.4 °C) (Oral)   • Resp 16   • Ht 66\" (167.6 cm)   • Wt 160 lb (72.6 kg)   • SpO2 90%   • BMI 25.82 kg/m2     Physical Exam  Temp:  [97.6 °F (36.4 °C)-97.9 °F (36.6 °C)] 97.6 °F (36.4 °C)  Heart Rate:  [] 106  Resp:  [16] 16  BP: ()/(61-67) 103/61  Constitutional: no acute distress, awake, alert  Respiratory: few fine crackles in bases bilat c/w atelectasis seen on CT. no WOB   Cardiovascular: RRR, no murmurs, rubs, or gallops, palpable pedal pulses bilaterally  Gastrointestinal: Positive bowel sounds, soft, nontender, nondistended  Musculoskeletal: No bilateral ankle edema, no clubbing or cyanosis to bilateral lower extremities, wearing TORIBIO hose. Left arm in sling.  Psychiatric: oriented x 3, appropriate affect, cooperative  Neurologic: follows commands, speech is clear,  strength 5/5 bilat UE    Discharge Disposition  Rehab Facility or Unit (DC - External)    Discharge Medications   Kayleigh Thompson   Home Medication Instructions JANIS:[id number]    Printed on:02/17/17 1314   Medication Information                      AMITRIPTYLINE HCL PO  Take 200 mg by mouth Every Night.             buPROPion (WELLBUTRIN) 100 MG tablet  Take 100 mg by mouth 3 (Three) Times a Day.             clonazePAM (KlonoPIN) 2 MG tablet  Take 2 mg by mouth 3 (Three) Times a Day As Needed for anxiety.             Cyanocobalamin (VITAMIN B 12 PO)  Take 1 tablet by mouth Daily.             docusate sodium 100 MG capsule  Take 100 mg by mouth 2 (Two) Times a Day As Needed for constipation (stop if having loose stools).             escitalopram (LEXAPRO) 10 MG tablet  Take 10 mg by mouth Daily.             gabapentin (NEURONTIN) 800 MG tablet  Take 800 mg by mouth 3 (Three) Times a Day.             miconazole (MICOTIN) 2 % vaginal cream  Insert 1 applicator into the vagina Every Night for 5 doses.             nicotine (NICODERM " CQ) 14 MG/24HR patch  Place 1 patch on the skin Daily.             oxyCODONE-acetaminophen (PERCOCET)  MG per tablet  Take 2 tablets by mouth Every 4 (Four) Hours As Needed for moderate pain (4-6) for up to 10 days.             rosuvastatin (CRESTOR) 40 MG tablet  Take 40 mg by mouth Daily.             ziprasidone (GEODON) 80 MG capsule  Take 80 mg by mouth 2 (Two) Times a Day With Meals.                 Discharge Diet:   Diet Instructions     Advance Diet As Tolerated                     Discharge Care Plan / Instructions:    Activity at Discharge:   Activity Instructions     Other Instructions (Specify)       Per Dr. Carcamo's recommendations                 Follow-up Appointments  No future appointments.  Referrals and Follow-ups to Schedule     Additional Follow-Up    As directed    Please schedule appt within 1 week post hospitalization eval with PCP,  O2 check and help arrange outpatient sleep study for suspected Sleep apnea       Ambulatory Referral to Home Health    As directed    Face to Face Visit Date:  2/16/2017   Follow-up Provider for Plan of Care?:  I will be treating the patient on an ongoing basis.  Please send me the Plan of Care for signature.   Follow-up Provider:  GURMEET CARCAMO   Reason/Clinical Findings:  s/p Left total shoulder   Describe mobility limitations that make leaving home difficult:  Impaired functional mobility and ADL's   Nursing/Therapeutic Services Requested:  Physical Therapy   PT orders:   Home safety assessment Comment - Check with Dr. Carcamo on restrictions please.  Gait Training  Strengthening      Weight Bearing Status:  As Tolerated   Frequency:  Other       Discharge Follow-up    As directed    Follow Up:  1 Week                 Test Results Pending at Discharge       Casie M Mayne, PA 02/17/17 1:14 PM    Time: Discharge 35 min    Please note that portions of this note may have been completed with a voice recognition program. Efforts were made to edit the  dictations, but occasionally words are mistranscribed.

## 2017-02-17 NOTE — PROGRESS NOTES
[facility]    Acute pain service Inpatient Progress Note    Patient Name: Kayleigh Thompson  :  1960  MRN:  [id number]        Acute Pain  Service Inpatient Progress Note:    Analgesia:Good  LOC: alert and awake  Resp Status: room air  Cardiac: VS stable  Side Effects:None  Catheter Site:clean  Cath type: peripheral nerve cath(MOOG pump)  Catheter Plan:Catheter removed and tip intact and Patient to be discharged home

## 2017-02-20 NOTE — THERAPY DISCHARGE NOTE
Acute Care - Physical Therapy Discharge Summary  [facility]       Patient Name: Kayleigh Thompson  : 1960  MRN: [id number]    Today's Date: 2017  Onset of Illness/Injury or Date of Surgery Date: 17    Date of Referral to PT: 17  Referring Physician: Dr. Carcamo      Admit Date: 2017      PT Recommendation and Plan    Visit Dx:    ICD-10-CM ICD-9-CM   1. Impaired functional mobility, balance, gait, and endurance Z74.09 V49.89   2. Impaired mobility and ADLs Z74.09 799.89   3. S/p reverse total shoulder arthroplasty, left Z96.612 V43.61                       IP PT Goals       17 0919 17 1116 17 1504    Bed Mobility PT LTG    Bed Mobility PT LTG, Date Established   17  -SC (r) LB (t) SC (c)    Bed Mobility PT LTG, Time to Achieve   5 days  -SC (r) LB (t) SC (c)    Bed Mobility PT LTG, Activity Type   all bed mobility  -SC (r) LB (t) SC (c)    Bed Mobility PT LTG, [place] Level   conditional independence  -SC (r) LB (t) SC (c)    Bed Mobility PT LTG, Outcome  goal met  -UD     Transfer Training PT LTG    Transfer Training PT LTG, Date Established   17  -SC (r) LB (t) SC (c)    Transfer Training PT LTG, Time to Achieve   5 days  -SC (r) LB (t) SC (c)    Transfer Training PT LTG, Activity Type   all transfers  -SC (r) LB (t) SC (c)    Transfer Training PT LTG, [place] Level   conditional independence  -SC (r) LB (t) SC (c)    Transfer Training PT LTG, Outcome  goal met  -UD     Gait Training PT LTG    Gait Training Goal PT LTG, Date Established   17  -SC (r) LB (t) SC (c)    Gait Training Goal PT LTG, Time to Achieve   5 days  -SC (r) LB (t) SC (c)    Gait Training Goal PT LTG, [place] Level   supervision required  -SC (r) LB (t) SC (c)    Gait Training Goal PT LTG, Assist Device   cane, straight  -SC (r) LB (t) SC (c)    Gait Training Goal PT LTG, Distance to Achieve   500 ft.  -SC (r) LB (t) SC (c)    Gait Training Goal PT LTG, Outcome  goal ongoing  -BD goal ongoing  -UD     Stair Training PT LTG    Stair Training Goal PT LTG, Date Established   02/14/17  -SC (r) LB (t) SC (c)    Stair Training Goal PT LTG, Time to Achieve   5 days  -SC (r) LB (t) SC (c)    Stair Training Goal PT LTG, Number of Steps   10  -SC (r) LB (t) SC (c)    Stair Training Goal PT LTG, [place] Level   conditional independence  -SC (r) LB (t) SC (c)    Stair Training Goal PT LTG, Assist Device   1 handrail  -SC (r) LB (t) SC (c)    Stair Training Goal PT LTG, Outcome goal ongoing  -BD goal ongoing  -UD       User Key  (r) = Recorded By, (t) = Taken By, (c) = Cosigned By    Initials Name Provider Type    ASHLI Costa, PT Physical Therapist    LUDWIN Churchill, PTA Physical Therapy Assistant    STACY Petersen, PT Physical Therapist    KAYLAH Barbosa, PT Student PT Student           Goals Status: Treatment plan discontinued secondary to discharge from acute facility.      PT Discharge Summary  Reason for Discharge: Discharge from facility  Outcomes Achieved: Refer to plan of care for updates on goals achieved  Discharge Destination: Home with home health      Rozina Lafleur, PT   2/20/2017

## (undated) DEVICE — MEDI-VAC NON-CONDUCTIVE SUCTION TUBING: Brand: CARDINAL HEALTH

## (undated) DEVICE — GLV SURG SIGNATURE TOUCH PF LTX 8 STRL BX/50

## (undated) DEVICE — ST NERV BLCK CONT CONTIPLEX ECHO CLSD 18G 4IN

## (undated) DEVICE — 2000CC GUARDIAN II: Brand: GUARDIAN

## (undated) DEVICE — ANTIBACTERIAL UNDYED BRAIDED (POLYGLACTIN 910), SYNTHETIC ABSORBABLE SUTURE: Brand: COATED VICRYL

## (undated) DEVICE — SOL LR 1000ML

## (undated) DEVICE — CANNULA,ADULT,SOFT-TOUCH,7TUBE,SC: Brand: MEDLINE

## (undated) DEVICE — KT PUMP PAIN ONQ CBLOC SELCTAFLO 400ML

## (undated) DEVICE — PKNG ABS BONE ORTHODRI 8IN

## (undated) DEVICE — PLUG BONE RESTR/CMT NO/HNDL 6TO8MM XXS
Type: IMPLANTABLE DEVICE | Site: SHOULDER | Status: NON-FUNCTIONAL
Removed: 2017-02-13

## (undated) DEVICE — STRYKER PERFORMANCE SERIES SAGITTAL BLADE: Brand: STRYKER PERFORMANCE SERIES

## (undated) DEVICE — SLNG ULTRSLING2 11TO13IN MD

## (undated) DEVICE — DRAPE,SHOULDER,BEACH CHAIR,STERILE: Brand: MEDLINE

## (undated) DEVICE — HOLDER: Brand: DEROYAL

## (undated) DEVICE — T4 PULLOVER TOGA, LARGE

## (undated) DEVICE — MEDI-VAC YANKAUER SUCTION HANDLE W/BULBOUS TIP: Brand: CARDINAL HEALTH

## (undated) DEVICE — SKIN AFFIX SURG ADHESIVE 72/CS 0.55ML: Brand: MEDLINE

## (undated) DEVICE — SUT MONOCRYL PLS ANTIB UND 3/0  PS1 27IN

## (undated) DEVICE — CEMENT MIXING SYSTEM WITH MIS FEMORAL BREAKAWAY NOZZLE: Brand: REVOLUTION

## (undated) DEVICE — PK MAJ SHLDR SPLT 10

## (undated) DEVICE — AIRWY 90MM NO9

## (undated) DEVICE — DRSNG SURG AQUACEL AG 9X25CM

## (undated) DEVICE — DRSNG SURG AQUACEL AG 9X15CM

## (undated) DEVICE — NERVE BLOCK SUPPORT KIT/BLUE: Brand: MEDLINE INDUSTRIES, INC.

## (undated) DEVICE — BIT DRL EQUINOXE 2 AND 3.2MM